# Patient Record
Sex: FEMALE | Race: WHITE | Employment: FULL TIME | URBAN - METROPOLITAN AREA
[De-identification: names, ages, dates, MRNs, and addresses within clinical notes are randomized per-mention and may not be internally consistent; named-entity substitution may affect disease eponyms.]

---

## 2017-02-07 ENCOUNTER — OFFICE VISIT (OUTPATIENT)
Dept: FAMILY MEDICINE CLINIC | Facility: CLINIC | Age: 42
End: 2017-02-07

## 2017-02-07 VITALS
HEIGHT: 63 IN | DIASTOLIC BLOOD PRESSURE: 80 MMHG | HEART RATE: 74 BPM | RESPIRATION RATE: 18 BRPM | BODY MASS INDEX: 27.46 KG/M2 | TEMPERATURE: 99 F | SYSTOLIC BLOOD PRESSURE: 120 MMHG | WEIGHT: 155 LBS

## 2017-02-07 DIAGNOSIS — Z01.419 WELL WOMAN EXAM WITH ROUTINE GYNECOLOGICAL EXAM: Primary | ICD-10-CM

## 2017-02-07 DIAGNOSIS — Z12.31 ENCOUNTER FOR SCREENING MAMMOGRAM FOR HIGH-RISK PATIENT: ICD-10-CM

## 2017-02-07 PROCEDURE — 99396 PREV VISIT EST AGE 40-64: CPT | Performed by: FAMILY MEDICINE

## 2017-02-07 PROCEDURE — 87624 HPV HI-RISK TYP POOLED RSLT: CPT | Performed by: FAMILY MEDICINE

## 2017-02-07 PROCEDURE — 88175 CYTOPATH C/V AUTO FLUID REDO: CPT | Performed by: FAMILY MEDICINE

## 2017-02-07 RX ORDER — MINOCYCLINE HYDROCHLORIDE 100 MG/1
100 CAPSULE ORAL DAILY
Refills: 0 | COMMUNITY
Start: 2017-01-25 | End: 2017-11-14

## 2017-02-07 NOTE — PROGRESS NOTES
HPI:   Donel Rubinstein is a 35year old female who presents for a complete physical exam.       Wt Readings from Last 6 Encounters:  02/07/17 : 155 lb  04/11/16 : 150 lb  11/17/15 : 153 lb  03/08/15 : 190 lb  12/20/14 : 191 lb  11/05/14 : 194 lb    Body m Alcohol Use: Yes                Comment: < 1 week    Occ:. : . Children: . 1  Exercise: none.   Diet: watches minimally     REVIEW OF SYSTEMS:   GENERAL: feels well otherwise  SKIN: denies any unusual skin lesions  EYES:denies blurred visio with routine gynecological exam    - T4 FREE [866]; Future  - TSH; Future  - Carey Trejo [id number]; Future  - Lipid Panel; Future  - CBC W Differential W Platelet; Future  - Vitamin B12; Future  - Vitamin D, 25-Hydroxy;  Future  - Comp Metabol

## 2017-02-10 LAB
HPV I/H RISK 1 DNA SPEC QL NAA+PROBE: NEGATIVE
LAST PAP RESULT: NORMAL
PAP HISTORY (OTHER THAN LAST PAP): NORMAL

## 2017-02-21 ENCOUNTER — HOSPITAL ENCOUNTER (OUTPATIENT)
Dept: MAMMOGRAPHY | Age: 42
Discharge: HOME | End: 2017-02-21
Attending: FAMILY MEDICINE
Payer: COMMERCIAL

## 2017-02-21 DIAGNOSIS — Z12.31 ENCOUNTER FOR SCREENING MAMMOGRAM FOR HIGH-RISK PATIENT: ICD-10-CM

## 2017-02-21 PROCEDURE — 77063 BREAST TOMOSYNTHESIS BI: CPT

## 2017-02-21 PROCEDURE — 77067 SCR MAMMO BI INCL CAD: CPT

## 2017-02-24 ENCOUNTER — HOSPITAL ENCOUNTER (OUTPATIENT)
Dept: ULTRASOUND IMAGING | Age: 42
Discharge: HOME | End: 2017-02-24
Attending: FAMILY MEDICINE
Payer: COMMERCIAL

## 2017-02-24 DIAGNOSIS — R92.8 ABNORMAL MAMMOGRAM OF LEFT BREAST: ICD-10-CM

## 2017-02-24 PROCEDURE — 76642 ULTRASOUND BREAST LIMITED: CPT

## 2017-02-25 ENCOUNTER — LAB ENCOUNTER (OUTPATIENT)
Dept: LAB | Age: 42
End: 2017-02-25
Attending: FAMILY MEDICINE
Payer: COMMERCIAL

## 2017-02-25 DIAGNOSIS — Z01.419 WELL WOMAN EXAM WITH ROUTINE GYNECOLOGICAL EXAM: ICD-10-CM

## 2017-02-25 LAB
25-HYDROXYVITAMIN D (TOTAL): 91.6 NG/ML (ref 30–100)
ALBUMIN SERPL-MCNC: 3.8 G/DL (ref 3.5–4.8)
ALP LIVER SERPL-CCNC: 42 U/L (ref 37–98)
ALT SERPL-CCNC: 21 U/L (ref 14–54)
AST SERPL-CCNC: 18 U/L (ref 15–41)
BASOPHILS # BLD AUTO: 0.06 X10(3) UL (ref 0–0.1)
BASOPHILS NFR BLD AUTO: 1 %
BILIRUB SERPL-MCNC: 0.7 MG/DL (ref 0.1–2)
BUN BLD-MCNC: 9 MG/DL (ref 8–20)
CALCIUM BLD-MCNC: 8.9 MG/DL (ref 8.3–10.3)
CHLORIDE: 108 MMOL/L (ref 101–111)
CHOLEST SMN-MCNC: 175 MG/DL (ref ?–200)
CO2: 28 MMOL/L (ref 22–32)
CREAT BLD-MCNC: 0.84 MG/DL (ref 0.55–1.02)
EOSINOPHIL # BLD AUTO: 0.06 X10(3) UL (ref 0–0.3)
EOSINOPHIL NFR BLD AUTO: 1 %
ERYTHROCYTE [DISTWIDTH] IN BLOOD BY AUTOMATED COUNT: 12.2 % (ref 11.5–16)
FREE T4: 1.1 NG/DL (ref 0.9–1.8)
GLUCOSE BLD-MCNC: 101 MG/DL (ref 70–99)
HAV AB SERPL IA-ACNC: 1620 PG/ML (ref 193–986)
HCT VFR BLD AUTO: 44.2 % (ref 34–50)
HDLC SERPL-MCNC: 61 MG/DL (ref 45–?)
HDLC SERPL: 2.87 {RATIO} (ref ?–4.44)
HGB BLD-MCNC: 14.5 G/DL (ref 12–16)
IMMATURE GRANULOCYTE COUNT: 0.02 X10(3) UL (ref 0–1)
IMMATURE GRANULOCYTE RATIO %: 0.3 %
LDLC SERPL CALC-MCNC: 103 MG/DL (ref ?–130)
LYMPHOCYTES # BLD AUTO: 2.06 X10(3) UL (ref 0.9–4)
LYMPHOCYTES NFR BLD AUTO: 33 %
M PROTEIN MFR SERPL ELPH: 7.1 G/DL (ref 6.1–8.3)
MCH RBC QN AUTO: 31.7 PG (ref 27–33.2)
MCHC RBC AUTO-ENTMCNC: 32.8 G/DL (ref 31–37)
MCV RBC AUTO: 96.7 FL (ref 81–100)
MONOCYTES # BLD AUTO: 0.71 X10(3) UL (ref 0.1–0.6)
MONOCYTES NFR BLD AUTO: 11.4 %
NEUTROPHIL ABS PRELIM: 3.34 X10 (3) UL (ref 1.3–6.7)
NEUTROPHILS # BLD AUTO: 3.34 X10(3) UL (ref 1.3–6.7)
NEUTROPHILS NFR BLD AUTO: 53.3 %
NONHDLC SERPL-MCNC: 114 MG/DL (ref ?–130)
PLATELET # BLD AUTO: 317 10(3)UL (ref 150–450)
POTASSIUM SERPL-SCNC: 4.7 MMOL/L (ref 3.6–5.1)
RBC # BLD AUTO: 4.57 X10(6)UL (ref 3.8–5.1)
RED CELL DISTRIBUTION WIDTH-SD: 43.4 FL (ref 35.1–46.3)
SODIUM SERPL-SCNC: 141 MMOL/L (ref 136–144)
T3FREE SERPL-MCNC: 2.53 PG/ML (ref 2.3–4.2)
TRIGLYCERIDES: 56 MG/DL (ref ?–150)
TSI SER-ACNC: 0.93 MIU/ML (ref 0.35–5.5)
VLDL: 11 MG/DL (ref 5–40)
WBC # BLD AUTO: 6.3 X10(3) UL (ref 4–13)

## 2017-02-25 PROCEDURE — 84439 ASSAY OF FREE THYROXINE: CPT

## 2017-02-25 PROCEDURE — 84481 FREE ASSAY (FT-3): CPT

## 2017-02-25 PROCEDURE — 84443 ASSAY THYROID STIM HORMONE: CPT

## 2017-02-25 PROCEDURE — 36415 COLL VENOUS BLD VENIPUNCTURE: CPT

## 2017-02-25 PROCEDURE — 80053 COMPREHEN METABOLIC PANEL: CPT

## 2017-02-25 PROCEDURE — 85025 COMPLETE CBC W/AUTO DIFF WBC: CPT

## 2017-02-25 PROCEDURE — 82306 VITAMIN D 25 HYDROXY: CPT

## 2017-02-25 PROCEDURE — 80061 LIPID PANEL: CPT

## 2017-02-25 PROCEDURE — 82607 VITAMIN B-12: CPT

## 2017-11-14 ENCOUNTER — OFFICE VISIT (OUTPATIENT)
Dept: FAMILY MEDICINE CLINIC | Facility: CLINIC | Age: 42
End: 2017-11-14

## 2017-11-14 VITALS
SYSTOLIC BLOOD PRESSURE: 134 MMHG | DIASTOLIC BLOOD PRESSURE: 76 MMHG | WEIGHT: 153 LBS | HEIGHT: 63 IN | TEMPERATURE: 98 F | BODY MASS INDEX: 27.11 KG/M2 | HEART RATE: 72 BPM | RESPIRATION RATE: 16 BRPM

## 2017-11-14 DIAGNOSIS — B35.6 TINEA CRURIS: Primary | ICD-10-CM

## 2017-11-14 PROCEDURE — 99213 OFFICE O/P EST LOW 20 MIN: CPT | Performed by: INTERNAL MEDICINE

## 2017-11-14 RX ORDER — DOXYCYCLINE HYCLATE 100 MG/1
CAPSULE ORAL
COMMUNITY
Start: 2017-10-01 | End: 2019-06-17

## 2017-11-14 RX ORDER — CLINDAMYCIN PHOSPHATE 10 MG/G
GEL TOPICAL
COMMUNITY
Start: 2016-08-31 | End: 2018-06-04

## 2017-11-14 RX ORDER — NYSTATIN 100000 U/G
CREAM TOPICAL
Qty: 30 G | Refills: 0 | Status: SHIPPED | OUTPATIENT
Start: 2017-11-14 | End: 2018-04-30

## 2017-11-14 RX ORDER — TRETINOIN 0.01 %
GEL (GRAM) TOPICAL AS NEEDED
COMMUNITY
Start: 2016-11-01 | End: 2021-03-22 | Stop reason: ALTCHOICE

## 2017-11-14 NOTE — PROGRESS NOTES
Roland Packer is a 38year old female. HPI:   Here for rash under right breast for 2 months. Tried lotriman for a week but no improvement. Slightly itches, but overall does not bother her. Exercises a lot.     Current Outpatient Prescriptions:  Doxycy

## 2018-05-01 RX ORDER — NYSTATIN 100000 U/G
CREAM TOPICAL
Qty: 30 G | Refills: 0 | Status: SHIPPED
Start: 2018-05-01 | End: 2019-06-17

## 2018-05-01 NOTE — TELEPHONE ENCOUNTER
Let pt know her nystatin was refilled.  She is overdue for annual physical and mammogram. Schedule that soon

## 2018-05-01 NOTE — TELEPHONE ENCOUNTER
From: Maxx Santiago  Sent: 4/30/2018 10:09 AM CDT  Subject: Medication Renewal Request    Roxanna Roberts would like a refill of the following medications:     nystatin 092150 UNIT/GM External Cream [Jillian Croft NP]   Patient Comment: Yeast

## 2018-06-04 ENCOUNTER — OFFICE VISIT (OUTPATIENT)
Dept: FAMILY MEDICINE CLINIC | Facility: CLINIC | Age: 43
End: 2018-06-04

## 2018-06-04 ENCOUNTER — LAB ENCOUNTER (OUTPATIENT)
Dept: LAB | Age: 43
End: 2018-06-04
Attending: INTERNAL MEDICINE
Payer: COMMERCIAL

## 2018-06-04 VITALS
TEMPERATURE: 99 F | HEIGHT: 63 IN | WEIGHT: 147 LBS | DIASTOLIC BLOOD PRESSURE: 78 MMHG | OXYGEN SATURATION: 99 % | HEART RATE: 70 BPM | RESPIRATION RATE: 18 BRPM | BODY MASS INDEX: 26.05 KG/M2 | SYSTOLIC BLOOD PRESSURE: 120 MMHG

## 2018-06-04 DIAGNOSIS — Z12.31 ENCOUNTER FOR SCREENING MAMMOGRAM FOR MALIGNANT NEOPLASM OF BREAST: ICD-10-CM

## 2018-06-04 DIAGNOSIS — Z01.419 WELL WOMAN EXAM: ICD-10-CM

## 2018-06-04 DIAGNOSIS — Z01.419 WELL WOMAN EXAM: Primary | ICD-10-CM

## 2018-06-04 PROCEDURE — 80050 GENERAL HEALTH PANEL: CPT | Performed by: INTERNAL MEDICINE

## 2018-06-04 PROCEDURE — 36415 COLL VENOUS BLD VENIPUNCTURE: CPT | Performed by: INTERNAL MEDICINE

## 2018-06-04 PROCEDURE — 80061 LIPID PANEL: CPT | Performed by: INTERNAL MEDICINE

## 2018-06-04 PROCEDURE — 82306 VITAMIN D 25 HYDROXY: CPT | Performed by: INTERNAL MEDICINE

## 2018-06-04 PROCEDURE — 99396 PREV VISIT EST AGE 40-64: CPT | Performed by: INTERNAL MEDICINE

## 2018-06-04 NOTE — PROGRESS NOTES
HPI:   Raynald Aschoff is a 38year old female who presents for a well woman exam. Symptoms: denies discharge, itching, burning or dysuria, periods are regular, flow is 4-5 days. Patient's last menstrual period was 06/01/2018.   Previous pap: 2017 elo anxiety  HEME: denies hx of anemia  ENDOCRINE: denies thyroid history, denies excessive thirst, denies significant weight change  ALL/ASTHMA: denies hx of allergy or asthma    EXAM:   /78   Pulse 70   Temp 98.7 °F (37.1 °C) (Oral)   Resp 18   Ht 63\" ask questions and verbalized understanding of care. Follow up . Abdirizak Valdovinos

## 2018-06-19 ENCOUNTER — OFFICE VISIT (OUTPATIENT)
Dept: FAMILY MEDICINE CLINIC | Facility: CLINIC | Age: 43
End: 2018-06-19

## 2018-06-19 VITALS
TEMPERATURE: 99 F | WEIGHT: 141 LBS | RESPIRATION RATE: 16 BRPM | DIASTOLIC BLOOD PRESSURE: 72 MMHG | BODY MASS INDEX: 24.98 KG/M2 | HEIGHT: 63 IN | HEART RATE: 86 BPM | SYSTOLIC BLOOD PRESSURE: 120 MMHG

## 2018-06-19 DIAGNOSIS — B07.8 COMMON WART: Primary | ICD-10-CM

## 2018-06-19 PROCEDURE — 17110 DESTRUCTION B9 LES UP TO 14: CPT | Performed by: INTERNAL MEDICINE

## 2018-06-19 RX ORDER — NYSTATIN 100000 [USP'U]/G
POWDER TOPICAL
Qty: 45 G | Refills: 1 | Status: SHIPPED | OUTPATIENT
Start: 2018-06-19 | End: 2019-06-17

## 2018-06-19 NOTE — PROGRESS NOTES
Shirin Richey presents with wart(s) on right toe. Alvaro Bruce is not experiencing pain. There are approximately one. She would like them treated. She has tried a pen freezing otc medication once with no results.     _____________________________________

## 2018-06-23 ENCOUNTER — HOSPITAL ENCOUNTER (OUTPATIENT)
Dept: MAMMOGRAPHY | Facility: HOSPITAL | Age: 43
Discharge: HOME | End: 2018-06-23
Attending: INTERNAL MEDICINE
Payer: COMMERCIAL

## 2018-06-23 DIAGNOSIS — Z12.31 ENCOUNTER FOR SCREENING MAMMOGRAM FOR MALIGNANT NEOPLASM OF BREAST: ICD-10-CM

## 2018-06-23 PROCEDURE — 77063 BREAST TOMOSYNTHESIS BI: CPT | Performed by: INTERNAL MEDICINE

## 2018-06-23 PROCEDURE — 77067 SCR MAMMO BI INCL CAD: CPT | Performed by: INTERNAL MEDICINE

## 2019-04-04 RX ORDER — NYSTATIN 100000 U/G
CREAM TOPICAL
Qty: 30 G | Refills: 0 | OUTPATIENT
Start: 2019-04-04

## 2019-06-17 ENCOUNTER — OFFICE VISIT (OUTPATIENT)
Dept: FAMILY MEDICINE CLINIC | Facility: CLINIC | Age: 44
End: 2019-06-17
Payer: COMMERCIAL

## 2019-06-17 VITALS
TEMPERATURE: 97 F | WEIGHT: 128 LBS | BODY MASS INDEX: 22.68 KG/M2 | DIASTOLIC BLOOD PRESSURE: 78 MMHG | RESPIRATION RATE: 20 BRPM | OXYGEN SATURATION: 98 % | HEART RATE: 61 BPM | HEIGHT: 63 IN | SYSTOLIC BLOOD PRESSURE: 122 MMHG

## 2019-06-17 DIAGNOSIS — Z00.00 ROUTINE GENERAL MEDICAL EXAMINATION AT A HEALTH CARE FACILITY: Primary | ICD-10-CM

## 2019-06-17 DIAGNOSIS — Z12.31 ENCOUNTER FOR SCREENING MAMMOGRAM FOR MALIGNANT NEOPLASM OF BREAST: ICD-10-CM

## 2019-06-17 DIAGNOSIS — M17.11 PRIMARY OSTEOARTHRITIS OF RIGHT KNEE: ICD-10-CM

## 2019-06-17 DIAGNOSIS — M67.40 GANGLION CYST: ICD-10-CM

## 2019-06-17 PROCEDURE — 99396 PREV VISIT EST AGE 40-64: CPT | Performed by: INTERNAL MEDICINE

## 2019-06-17 RX ORDER — NYSTATIN 100000 U/G
CREAM TOPICAL
Qty: 30 G | Refills: 0 | Status: SHIPPED | OUTPATIENT
Start: 2019-06-17 | End: 2020-07-16 | Stop reason: ALTCHOICE

## 2019-06-17 NOTE — PROGRESS NOTES
HPI:   Donel Rubinstein is a 37year old female who presents for a well woman exam. Symptoms: denies discharge, itching, burning or dysuria, periods are regular, light flow, duration 4-5. Patient's last menstrual period was 05/31/2019.   Previous pap: 20 pain  MS: denies back pain  NEURO: denies headaches or dizziness  PSYCH: denies depression or anxiety  HEME: denies hx of anemia  ENDOCRINE: denies thyroid history, denies excessive thirst, denies significant weight change  ALL/ASTHMA: denies hx of allergy breast  - VENUS RUBINA 2D+3D SCREENING BILAT (CPT=77067/06764); Future    4.  Primary osteoarthritis of right knee  Pt reassurance  Discussed exercises to avoid to lessen strain to the knees        Cruz Player was given an opportunity to ask questions and verbalized

## 2019-06-29 ENCOUNTER — LAB ENCOUNTER (OUTPATIENT)
Dept: LAB | Age: 44
End: 2019-06-29
Attending: INTERNAL MEDICINE
Payer: COMMERCIAL

## 2019-06-29 ENCOUNTER — HOSPITAL ENCOUNTER (OUTPATIENT)
Dept: MAMMOGRAPHY | Age: 44
Discharge: HOME | End: 2019-06-29
Attending: INTERNAL MEDICINE
Payer: COMMERCIAL

## 2019-06-29 DIAGNOSIS — Z12.31 ENCOUNTER FOR SCREENING MAMMOGRAM FOR MALIGNANT NEOPLASM OF BREAST: ICD-10-CM

## 2019-06-29 DIAGNOSIS — Z00.00 ROUTINE GENERAL MEDICAL EXAMINATION AT A HEALTH CARE FACILITY: ICD-10-CM

## 2019-06-29 LAB
ALBUMIN SERPL-MCNC: 3.2 G/DL (ref 3.4–5)
ALBUMIN/GLOB SERPL: 1 {RATIO} (ref 1–2)
ALP LIVER SERPL-CCNC: 33 U/L (ref 37–98)
ALT SERPL-CCNC: 19 U/L (ref 13–56)
ANION GAP SERPL CALC-SCNC: 2 MMOL/L (ref 0–18)
AST SERPL-CCNC: 19 U/L (ref 15–37)
BASOPHILS # BLD AUTO: 0.04 X10(3) UL (ref 0–0.2)
BASOPHILS NFR BLD AUTO: 0.7 %
BILIRUB SERPL-MCNC: 0.4 MG/DL (ref 0.1–2)
BUN BLD-MCNC: 13 MG/DL (ref 7–18)
BUN/CREAT SERPL: 15.3 (ref 10–20)
CALCIUM BLD-MCNC: 8.7 MG/DL (ref 8.5–10.1)
CHLORIDE SERPL-SCNC: 110 MMOL/L (ref 98–112)
CHOLEST SMN-MCNC: 140 MG/DL (ref ?–200)
CO2 SERPL-SCNC: 28 MMOL/L (ref 21–32)
CREAT BLD-MCNC: 0.85 MG/DL (ref 0.55–1.02)
DEPRECATED RDW RBC AUTO: 45.2 FL (ref 35.1–46.3)
EOSINOPHIL # BLD AUTO: 0.1 X10(3) UL (ref 0–0.7)
EOSINOPHIL NFR BLD AUTO: 1.8 %
ERYTHROCYTE [DISTWIDTH] IN BLOOD BY AUTOMATED COUNT: 12.6 % (ref 11–15)
GLOBULIN PLAS-MCNC: 3.2 G/DL (ref 2.8–4.4)
GLUCOSE BLD-MCNC: 93 MG/DL (ref 70–99)
HCT VFR BLD AUTO: 41.2 % (ref 35–48)
HDLC SERPL-MCNC: 50 MG/DL (ref 40–59)
HGB BLD-MCNC: 13.3 G/DL (ref 12–16)
IMM GRANULOCYTES # BLD AUTO: 0.01 X10(3) UL (ref 0–1)
IMM GRANULOCYTES NFR BLD: 0.2 %
LDLC SERPL CALC-MCNC: 82 MG/DL (ref ?–100)
LYMPHOCYTES # BLD AUTO: 1.73 X10(3) UL (ref 1–4)
LYMPHOCYTES NFR BLD AUTO: 31.6 %
M PROTEIN MFR SERPL ELPH: 6.4 G/DL (ref 6.4–8.2)
MCH RBC QN AUTO: 31.4 PG (ref 26–34)
MCHC RBC AUTO-ENTMCNC: 32.3 G/DL (ref 31–37)
MCV RBC AUTO: 97.4 FL (ref 80–100)
MONOCYTES # BLD AUTO: 0.63 X10(3) UL (ref 0.1–1)
MONOCYTES NFR BLD AUTO: 11.5 %
NEUTROPHILS # BLD AUTO: 2.96 X10 (3) UL (ref 1.5–7.7)
NEUTROPHILS # BLD AUTO: 2.96 X10(3) UL (ref 1.5–7.7)
NEUTROPHILS NFR BLD AUTO: 54.2 %
NONHDLC SERPL-MCNC: 90 MG/DL (ref ?–130)
OSMOLALITY SERPL CALC.SUM OF ELEC: 290 MOSM/KG (ref 275–295)
PLATELET # BLD AUTO: 252 10(3)UL (ref 150–450)
POTASSIUM SERPL-SCNC: 4.5 MMOL/L (ref 3.5–5.1)
RBC # BLD AUTO: 4.23 X10(6)UL (ref 3.8–5.3)
SODIUM SERPL-SCNC: 140 MMOL/L (ref 136–145)
TRIGL SERPL-MCNC: 41 MG/DL (ref 30–149)
TSI SER-ACNC: 1.26 MIU/ML (ref 0.36–3.74)
VIT D+METAB SERPL-MCNC: 100.2 NG/ML (ref 30–100)
VLDLC SERPL CALC-MCNC: 8 MG/DL (ref 0–30)
WBC # BLD AUTO: 5.5 X10(3) UL (ref 4–11)

## 2019-06-29 PROCEDURE — 77063 BREAST TOMOSYNTHESIS BI: CPT | Performed by: INTERNAL MEDICINE

## 2019-06-29 PROCEDURE — 80061 LIPID PANEL: CPT | Performed by: INTERNAL MEDICINE

## 2019-06-29 PROCEDURE — 82306 VITAMIN D 25 HYDROXY: CPT | Performed by: INTERNAL MEDICINE

## 2019-06-29 PROCEDURE — 80050 GENERAL HEALTH PANEL: CPT | Performed by: INTERNAL MEDICINE

## 2019-06-29 PROCEDURE — 77067 SCR MAMMO BI INCL CAD: CPT | Performed by: INTERNAL MEDICINE

## 2019-06-29 PROCEDURE — 36415 COLL VENOUS BLD VENIPUNCTURE: CPT | Performed by: INTERNAL MEDICINE

## 2020-07-16 ENCOUNTER — OFFICE VISIT (OUTPATIENT)
Dept: FAMILY MEDICINE CLINIC | Facility: CLINIC | Age: 45
End: 2020-07-16
Payer: COMMERCIAL

## 2020-07-16 VITALS
SYSTOLIC BLOOD PRESSURE: 122 MMHG | RESPIRATION RATE: 16 BRPM | HEART RATE: 80 BPM | DIASTOLIC BLOOD PRESSURE: 72 MMHG | OXYGEN SATURATION: 98 % | BODY MASS INDEX: 21.79 KG/M2 | TEMPERATURE: 98 F | HEIGHT: 63 IN | WEIGHT: 123 LBS

## 2020-07-16 DIAGNOSIS — Z01.419 WELL WOMAN EXAM WITH ROUTINE GYNECOLOGICAL EXAM: Primary | ICD-10-CM

## 2020-07-16 DIAGNOSIS — Z00.00 ANNUAL PHYSICAL EXAM: ICD-10-CM

## 2020-07-16 DIAGNOSIS — Z12.31 ENCOUNTER FOR SCREENING MAMMOGRAM FOR HIGH-RISK PATIENT: ICD-10-CM

## 2020-07-16 PROCEDURE — 90715 TDAP VACCINE 7 YRS/> IM: CPT | Performed by: FAMILY MEDICINE

## 2020-07-16 PROCEDURE — 87625 HPV TYPES 16 & 18 ONLY: CPT | Performed by: FAMILY MEDICINE

## 2020-07-16 PROCEDURE — 3078F DIAST BP <80 MM HG: CPT | Performed by: FAMILY MEDICINE

## 2020-07-16 PROCEDURE — 88175 CYTOPATH C/V AUTO FLUID REDO: CPT | Performed by: FAMILY MEDICINE

## 2020-07-16 PROCEDURE — 90471 IMMUNIZATION ADMIN: CPT | Performed by: FAMILY MEDICINE

## 2020-07-16 PROCEDURE — 3008F BODY MASS INDEX DOCD: CPT | Performed by: FAMILY MEDICINE

## 2020-07-16 PROCEDURE — 87624 HPV HI-RISK TYP POOLED RSLT: CPT | Performed by: FAMILY MEDICINE

## 2020-07-16 PROCEDURE — 3074F SYST BP LT 130 MM HG: CPT | Performed by: FAMILY MEDICINE

## 2020-07-16 PROCEDURE — 99396 PREV VISIT EST AGE 40-64: CPT | Performed by: FAMILY MEDICINE

## 2020-07-16 NOTE — PROGRESS NOTES
HPI:   Jacy Peña is a 53year old female who presents for a complete physical exam.       Wt Readings from Last 6 Encounters:  07/16/20 : 123 lb (55.8 kg)  07/18/19 : 130 lb (59 kg)  06/17/19 : 128 lb (58.1 kg)  06/19/18 : 141 lb (64 kg)  06/04/18 : • Diabetes Father    • Hypertension Mother    • Cancer Maternal Grandmother         colon   • Breast Cancer Maternal Cousin Female 35   • Heart Disease Neg    • Stroke Neg       Social History:   Social History    Tobacco Use      Smoking status: Never internal and external hemorrhoids   MUSCULOSKELETAL: back is not tender,FROM of the back  EXTREMITIES: no cyanosis, clubbing or edema  NEURO: cranial nerves are intact,motor and sensory are grossly intact    ASSESSMENT AND PLAN:   Ayan gomez a Kimberlyn

## 2020-07-17 LAB — HPV I/H RISK 1 DNA SPEC QL NAA+PROBE: POSITIVE

## 2020-07-21 DIAGNOSIS — R87.619 ABNORMAL CERVICAL PAPANICOLAOU SMEAR, UNSPECIFIED ABNORMAL PAP FINDING: Primary | ICD-10-CM

## 2020-07-21 LAB
HPV16 DNA CVX QL PROBE+SIG AMP: NEGATIVE
HPV18 DNA CVX QL PROBE+SIG AMP: POSITIVE

## 2020-07-23 ENCOUNTER — OFFICE VISIT (OUTPATIENT)
Dept: OBGYN CLINIC | Facility: CLINIC | Age: 45
End: 2020-07-23
Payer: COMMERCIAL

## 2020-07-23 VITALS
SYSTOLIC BLOOD PRESSURE: 106 MMHG | BODY MASS INDEX: 22.15 KG/M2 | HEIGHT: 63 IN | HEART RATE: 54 BPM | DIASTOLIC BLOOD PRESSURE: 62 MMHG | WEIGHT: 125 LBS

## 2020-07-23 DIAGNOSIS — Z01.818 ENCOUNTER FOR PREOPERATIVE EXAMINATION FOR GENERAL SURGICAL PROCEDURE: ICD-10-CM

## 2020-07-23 DIAGNOSIS — R87.810 CERVICAL HIGH RISK HUMAN PAPILLOMAVIRUS (HPV) DNA TEST POSITIVE: ICD-10-CM

## 2020-07-23 DIAGNOSIS — R87.612 PAPANICOLAOU SMEAR OF CERVIX WITH LOW GRADE SQUAMOUS INTRAEPITHELIAL LESION (LGSIL): Primary | ICD-10-CM

## 2020-07-23 LAB
CONTROL LINE PRESENT WITH A CLEAR BACKGROUND (YES/NO): YES YES/NO
PREGNANCY TEST, URINE: NEGATIVE

## 2020-07-23 PROCEDURE — 88305 TISSUE EXAM BY PATHOLOGIST: CPT | Performed by: OBSTETRICS & GYNECOLOGY

## 2020-07-23 PROCEDURE — 3008F BODY MASS INDEX DOCD: CPT | Performed by: OBSTETRICS & GYNECOLOGY

## 2020-07-23 PROCEDURE — 57454 BX/CURETT OF CERVIX W/SCOPE: CPT | Performed by: OBSTETRICS & GYNECOLOGY

## 2020-07-23 PROCEDURE — 3074F SYST BP LT 130 MM HG: CPT | Performed by: OBSTETRICS & GYNECOLOGY

## 2020-07-23 PROCEDURE — 81025 URINE PREGNANCY TEST: CPT | Performed by: OBSTETRICS & GYNECOLOGY

## 2020-07-23 PROCEDURE — 3078F DIAST BP <80 MM HG: CPT | Performed by: OBSTETRICS & GYNECOLOGY

## 2020-07-23 NOTE — PROGRESS NOTES
Colpo w/Cx Biopsy and ECC       LGSL HPV 18/45 pos    Pregnancy Results: negative from urine test     Discussed high/low risk HPV, dormant vs active state of the virus, unknown exposure time.  Discussed pap smear being a screening test and if abnormal follo

## 2020-07-24 ENCOUNTER — HOSPITAL ENCOUNTER (OUTPATIENT)
Dept: MAMMOGRAPHY | Age: 45
Discharge: HOME | End: 2020-07-24
Attending: FAMILY MEDICINE
Payer: COMMERCIAL

## 2020-07-24 DIAGNOSIS — Z12.31 ENCOUNTER FOR SCREENING MAMMOGRAM FOR HIGH-RISK PATIENT: ICD-10-CM

## 2020-07-24 PROCEDURE — 77063 BREAST TOMOSYNTHESIS BI: CPT | Performed by: FAMILY MEDICINE

## 2020-07-24 PROCEDURE — 77067 SCR MAMMO BI INCL CAD: CPT | Performed by: FAMILY MEDICINE

## 2020-08-03 ENCOUNTER — LAB ENCOUNTER (OUTPATIENT)
Dept: LAB | Age: 45
End: 2020-08-03
Attending: FAMILY MEDICINE
Payer: COMMERCIAL

## 2020-08-03 DIAGNOSIS — Z00.00 ANNUAL PHYSICAL EXAM: ICD-10-CM

## 2020-08-03 LAB
ALBUMIN SERPL-MCNC: 3.5 G/DL (ref 3.4–5)
ALBUMIN/GLOB SERPL: 1 {RATIO} (ref 1–2)
ALP LIVER SERPL-CCNC: 40 U/L (ref 39–100)
ALT SERPL-CCNC: 44 U/L (ref 13–56)
ANION GAP SERPL CALC-SCNC: <0 MMOL/L (ref 0–18)
AST SERPL-CCNC: 40 U/L (ref 15–37)
BASOPHILS # BLD AUTO: 0.07 X10(3) UL (ref 0–0.2)
BASOPHILS NFR BLD AUTO: 1.3 %
BILIRUB SERPL-MCNC: 0.3 MG/DL (ref 0.1–2)
BUN BLD-MCNC: 20 MG/DL (ref 7–18)
BUN/CREAT SERPL: 21.3 (ref 10–20)
CALCIUM BLD-MCNC: 8.3 MG/DL (ref 8.5–10.1)
CHLORIDE SERPL-SCNC: 109 MMOL/L (ref 98–112)
CHOLEST SMN-MCNC: 184 MG/DL (ref ?–200)
CO2 SERPL-SCNC: 30 MMOL/L (ref 21–32)
CREAT BLD-MCNC: 0.94 MG/DL (ref 0.55–1.02)
DEPRECATED RDW RBC AUTO: 45.8 FL (ref 35.1–46.3)
EOSINOPHIL # BLD AUTO: 0.1 X10(3) UL (ref 0–0.7)
EOSINOPHIL NFR BLD AUTO: 1.9 %
ERYTHROCYTE [DISTWIDTH] IN BLOOD BY AUTOMATED COUNT: 12.5 % (ref 11–15)
GLOBULIN PLAS-MCNC: 3.4 G/DL (ref 2.8–4.4)
GLUCOSE BLD-MCNC: 90 MG/DL (ref 70–99)
HCT VFR BLD AUTO: 41.1 % (ref 35–48)
HDLC SERPL-MCNC: 56 MG/DL (ref 40–59)
HGB BLD-MCNC: 13.1 G/DL (ref 12–16)
IMM GRANULOCYTES # BLD AUTO: 0.01 X10(3) UL (ref 0–1)
IMM GRANULOCYTES NFR BLD: 0.2 %
LDLC SERPL CALC-MCNC: 118 MG/DL (ref ?–100)
LYMPHOCYTES # BLD AUTO: 1.94 X10(3) UL (ref 1–4)
LYMPHOCYTES NFR BLD AUTO: 36.3 %
M PROTEIN MFR SERPL ELPH: 6.9 G/DL (ref 6.4–8.2)
MCH RBC QN AUTO: 31.6 PG (ref 26–34)
MCHC RBC AUTO-ENTMCNC: 31.9 G/DL (ref 31–37)
MCV RBC AUTO: 99 FL (ref 80–100)
MONOCYTES # BLD AUTO: 0.63 X10(3) UL (ref 0.1–1)
MONOCYTES NFR BLD AUTO: 11.8 %
NEUTROPHILS # BLD AUTO: 2.59 X10 (3) UL (ref 1.5–7.7)
NEUTROPHILS # BLD AUTO: 2.59 X10(3) UL (ref 1.5–7.7)
NEUTROPHILS NFR BLD AUTO: 48.5 %
NONHDLC SERPL-MCNC: 128 MG/DL (ref ?–130)
OSMOLALITY SERPL CALC.SUM OF ELEC: 288 MOSM/KG (ref 275–295)
PATIENT FASTING Y/N/NP: YES
PATIENT FASTING Y/N/NP: YES
PLATELET # BLD AUTO: 286 10(3)UL (ref 150–450)
POTASSIUM SERPL-SCNC: 4.3 MMOL/L (ref 3.5–5.1)
RBC # BLD AUTO: 4.15 X10(6)UL (ref 3.8–5.3)
SODIUM SERPL-SCNC: 138 MMOL/L (ref 136–145)
T4 FREE SERPL-MCNC: 1 NG/DL (ref 0.8–1.7)
TRIGL SERPL-MCNC: 50 MG/DL (ref 30–149)
TSI SER-ACNC: 1.97 MIU/ML (ref 0.36–3.74)
VIT B12 SERPL-MCNC: 1133 PG/ML (ref 193–986)
VIT D+METAB SERPL-MCNC: 107.5 NG/ML (ref 30–100)
VLDLC SERPL CALC-MCNC: 10 MG/DL (ref 0–30)
WBC # BLD AUTO: 5.3 X10(3) UL (ref 4–11)

## 2020-08-03 PROCEDURE — 80050 GENERAL HEALTH PANEL: CPT | Performed by: FAMILY MEDICINE

## 2020-08-03 PROCEDURE — 80061 LIPID PANEL: CPT | Performed by: FAMILY MEDICINE

## 2020-08-03 PROCEDURE — 36415 COLL VENOUS BLD VENIPUNCTURE: CPT | Performed by: FAMILY MEDICINE

## 2020-08-03 PROCEDURE — 84439 ASSAY OF FREE THYROXINE: CPT | Performed by: FAMILY MEDICINE

## 2020-08-03 PROCEDURE — 82306 VITAMIN D 25 HYDROXY: CPT | Performed by: FAMILY MEDICINE

## 2020-08-03 PROCEDURE — 82607 VITAMIN B-12: CPT | Performed by: FAMILY MEDICINE

## 2020-08-04 DIAGNOSIS — E55.9 VITAMIN D DEFICIENCY: Primary | ICD-10-CM

## 2020-08-04 DIAGNOSIS — R74.8 ELEVATED LIVER ENZYMES: ICD-10-CM

## 2020-08-05 NOTE — PROGRESS NOTES
Patient aware of colposcopy results and recommendations for cryo. Appointment schedule.  Patient verbalizes understanding

## 2020-08-24 ENCOUNTER — VIRTUAL PHONE E/M (OUTPATIENT)
Dept: FAMILY MEDICINE CLINIC | Facility: CLINIC | Age: 45
End: 2020-08-24
Payer: COMMERCIAL

## 2020-08-24 ENCOUNTER — OFFICE VISIT (OUTPATIENT)
Dept: OBGYN CLINIC | Facility: CLINIC | Age: 45
End: 2020-08-24
Payer: COMMERCIAL

## 2020-08-24 VITALS
HEART RATE: 86 BPM | SYSTOLIC BLOOD PRESSURE: 114 MMHG | WEIGHT: 126 LBS | HEIGHT: 63 IN | BODY MASS INDEX: 22.32 KG/M2 | DIASTOLIC BLOOD PRESSURE: 70 MMHG

## 2020-08-24 DIAGNOSIS — R21 RASH: Primary | ICD-10-CM

## 2020-08-24 DIAGNOSIS — R87.619 ABNORMAL CERVICAL PAPANICOLAOU SMEAR, UNSPECIFIED ABNORMAL PAP FINDING: ICD-10-CM

## 2020-08-24 DIAGNOSIS — N87.0 CIN I (CERVICAL INTRAEPITHELIAL NEOPLASIA I): Primary | ICD-10-CM

## 2020-08-24 LAB
CONTROL LINE PRESENT WITH A CLEAR BACKGROUND (YES/NO): YES YES/NO
PREGNANCY TEST, URINE: NEGATIVE

## 2020-08-24 PROCEDURE — 3074F SYST BP LT 130 MM HG: CPT | Performed by: OBSTETRICS & GYNECOLOGY

## 2020-08-24 PROCEDURE — 57511 CRYOCAUTERY OF CERVIX: CPT | Performed by: OBSTETRICS & GYNECOLOGY

## 2020-08-24 PROCEDURE — 3008F BODY MASS INDEX DOCD: CPT | Performed by: OBSTETRICS & GYNECOLOGY

## 2020-08-24 PROCEDURE — 99213 OFFICE O/P EST LOW 20 MIN: CPT | Performed by: INTERNAL MEDICINE

## 2020-08-24 PROCEDURE — 81025 URINE PREGNANCY TEST: CPT | Performed by: OBSTETRICS & GYNECOLOGY

## 2020-08-24 PROCEDURE — 3078F DIAST BP <80 MM HG: CPT | Performed by: OBSTETRICS & GYNECOLOGY

## 2020-08-24 NOTE — PROGRESS NOTES
Virtual Telephone Check-In    Dainasujatha Lanejanes verbally consents to a Virtual/Telephone Check-In visit on 08/24/20. Patient has been referred to the [facility] website at [email or web address] to review the yearly Consent to Treat document.     Patient jessa AND PLAN:   Rash  (primary encounter diagnosis)  - history correlates to tinea but picture appears as nummular eczema  - trial of triamcinolone for 1 week, if not improving then we'll switch to an anti fungal. Pt agrees     No orders of the defined types w

## 2020-08-24 NOTE — PROGRESS NOTES
CRYO OF THE CERVIX    IAIN 1    Patient was placed in lithotomy position  Speculum placed vaginally, cervix appears normal, no lesions  Cryo probe placed against cervix and freezing done for 60 sec, repeated x2    Patient tolerated well  No complications

## 2020-11-05 ENCOUNTER — OFFICE VISIT (OUTPATIENT)
Dept: PODIATRY CLINIC | Facility: CLINIC | Age: 45
End: 2020-11-05
Payer: COMMERCIAL

## 2020-11-05 DIAGNOSIS — G57.62 MORTON'S NEUROMA OF LEFT FOOT: ICD-10-CM

## 2020-11-05 DIAGNOSIS — M20.12 VALGUS DEFORMITY OF BOTH GREAT TOES: Primary | ICD-10-CM

## 2020-11-05 DIAGNOSIS — M20.11 VALGUS DEFORMITY OF BOTH GREAT TOES: Primary | ICD-10-CM

## 2020-11-05 PROCEDURE — 99203 OFFICE O/P NEW LOW 30 MIN: CPT | Performed by: PODIATRIST

## 2020-11-05 PROCEDURE — 99072 ADDL SUPL MATRL&STAF TM PHE: CPT | Performed by: PODIATRIST

## 2020-11-05 NOTE — PROGRESS NOTES
Liliana Pickett is a 50year old female. Patient presents with:  New Patient: Consult on bunions bilateral feet - not too painful, but hard to find shoes - some tingling by toes left foot.         HPI:   The patient presents today for initial evaluation Topics      Concerns:        Caffeine Concern: Yes        Exercise: Yes    Social History Narrative      ** Merged History Encounter **                 REVIEW OF SYSTEMS:   Review of Systems    Today reviewed systems as documented below  GENERAL HEALTH: fe of the perioperative course, possible complications, and alternate forms of care; these were all discussed with the patient. The consultation sheet was reviewed with the patient in detail.  All questions were answered and the patient signed the consultation

## 2020-11-09 ENCOUNTER — LAB ENCOUNTER (OUTPATIENT)
Dept: LAB | Age: 45
End: 2020-11-09
Attending: FAMILY MEDICINE
Payer: COMMERCIAL

## 2020-11-09 DIAGNOSIS — E55.9 VITAMIN D DEFICIENCY: ICD-10-CM

## 2020-11-09 DIAGNOSIS — R74.8 ELEVATED LIVER ENZYMES: ICD-10-CM

## 2020-11-09 PROCEDURE — 82306 VITAMIN D 25 HYDROXY: CPT

## 2020-11-09 PROCEDURE — 80053 COMPREHEN METABOLIC PANEL: CPT

## 2020-11-09 PROCEDURE — 36415 COLL VENOUS BLD VENIPUNCTURE: CPT

## 2020-12-07 ENCOUNTER — OFFICE VISIT (OUTPATIENT)
Dept: PODIATRY CLINIC | Facility: CLINIC | Age: 45
End: 2020-12-07
Payer: COMMERCIAL

## 2020-12-07 VITALS — HEIGHT: 63 IN | BODY MASS INDEX: 22.15 KG/M2 | WEIGHT: 125 LBS

## 2020-12-07 DIAGNOSIS — G57.61 MORTON'S NEUROMA OF RIGHT FOOT: Primary | ICD-10-CM

## 2020-12-07 DIAGNOSIS — M79.671 RIGHT FOOT PAIN: ICD-10-CM

## 2020-12-07 DIAGNOSIS — M20.12 VALGUS DEFORMITY OF BOTH GREAT TOES: ICD-10-CM

## 2020-12-07 DIAGNOSIS — M20.11 VALGUS DEFORMITY OF BOTH GREAT TOES: ICD-10-CM

## 2020-12-07 DIAGNOSIS — D36.10 NEUROMA: ICD-10-CM

## 2020-12-07 PROCEDURE — 99213 OFFICE O/P EST LOW 20 MIN: CPT | Performed by: PODIATRIST

## 2020-12-07 PROCEDURE — 3008F BODY MASS INDEX DOCD: CPT | Performed by: PODIATRIST

## 2020-12-08 NOTE — PROGRESS NOTES
Kenya Bartlett is a 50year old female. Patient presents with:   Follow - Up: bilateral bunions ,insoles did not help ,wants to discuss surgery         HPI:     Patient presents today for follow-up we did try some temporary orthotics Clair garza did Sexual Activity      Alcohol use: Never        Frequency: Never      Drug use: Never    Other Topics      Concerns:        Caffeine Concern: Yes        Exercise: Yes    Social History Narrative      ** Merged History Encounter **                 REVIEW OF of year. The patient indicates understanding of these issues and agrees to the plan. Return for Appropriate testing has been performed.     Ebony Wilson DPM  12/8/2020

## 2020-12-29 ENCOUNTER — OFFICE VISIT (OUTPATIENT)
Dept: PODIATRY CLINIC | Facility: CLINIC | Age: 45
End: 2020-12-29
Payer: COMMERCIAL

## 2020-12-29 VITALS — HEIGHT: 63 IN | BODY MASS INDEX: 22.15 KG/M2 | WEIGHT: 125 LBS

## 2020-12-29 DIAGNOSIS — M19.071 PRIMARY OSTEOARTHRITIS OF BOTH FEET: ICD-10-CM

## 2020-12-29 DIAGNOSIS — M19.072 PRIMARY OSTEOARTHRITIS OF BOTH FEET: ICD-10-CM

## 2020-12-29 DIAGNOSIS — M20.12 VALGUS DEFORMITY OF BOTH GREAT TOES: Primary | ICD-10-CM

## 2020-12-29 DIAGNOSIS — M20.11 VALGUS DEFORMITY OF BOTH GREAT TOES: Primary | ICD-10-CM

## 2020-12-29 PROCEDURE — 3008F BODY MASS INDEX DOCD: CPT | Performed by: PODIATRIST

## 2020-12-29 PROCEDURE — 99213 OFFICE O/P EST LOW 20 MIN: CPT | Performed by: PODIATRIST

## 2020-12-29 NOTE — PROGRESS NOTES
Elsa Walter is a 50year old female.  Patient presents with:  Test Results: US both feet        HPI:     Presents today for follow-up evaluation she had diagnostic ultrasound done on both feet findings are consistent with a osteoarthritis of the seco Never      Drug use: Never    Other Topics      Concerns:        Caffeine Concern: Yes        Exercise: Yes    Social History Narrative      ** Merged History Encounter **                 REVIEW OF SYSTEMS:   Review of Systems    Today reviewed systems as second MP joint to relieve some of the arthritic symptoms that may be present. This could be done at the time of surgery while she is under anesthesia.   Patient will contact us if she desires to proceed with surgery    The patient indicates understanding

## 2020-12-31 ENCOUNTER — TELEPHONE (OUTPATIENT)
Dept: PODIATRY CLINIC | Facility: CLINIC | Age: 45
End: 2020-12-31

## 2020-12-31 NOTE — TELEPHONE ENCOUNTER
Patient called in requesting surgery be scheduled for her left foot which was done so this date. Patient will undergo Z bunionectomy with possible phalangeal osteotomy, left foot at [facility].   Reviewed pre and post op instructions with patient who voiced unde

## 2021-01-05 ENCOUNTER — LAB REQUISITION (OUTPATIENT)
Dept: LAB | Facility: HOSPITAL | Age: 46
End: 2021-01-05
Payer: COMMERCIAL

## 2021-01-05 DIAGNOSIS — Z01.818 ENCOUNTER FOR OTHER PREPROCEDURAL EXAMINATION: ICD-10-CM

## 2021-01-07 LAB — SARS-COV-2 RNA RESP QL NAA+PROBE: NOT DETECTED

## 2021-01-09 ENCOUNTER — TELEPHONE (OUTPATIENT)
Dept: PODIATRY CLINIC | Facility: CLINIC | Age: 46
End: 2021-01-09

## 2021-01-09 NOTE — TELEPHONE ENCOUNTER
Procedure date:1/8/21  How are you feeling? Feeling fine  Any bleeding? No  Is the dressing dry & intact? Yes  Level of pain? 4/10  Character of pain: aching  Alleviating factors: elevating and icing  Are you taking the prescribed medication?  Yes  Are you

## 2021-01-11 ENCOUNTER — OFFICE VISIT (OUTPATIENT)
Dept: PODIATRY CLINIC | Facility: CLINIC | Age: 46
End: 2021-01-11
Payer: COMMERCIAL

## 2021-01-11 DIAGNOSIS — M20.12 VALGUS DEFORMITY OF BOTH GREAT TOES: Primary | ICD-10-CM

## 2021-01-11 DIAGNOSIS — M20.12 HALLUX VALGUS OF LEFT FOOT: ICD-10-CM

## 2021-01-11 DIAGNOSIS — M20.11 VALGUS DEFORMITY OF BOTH GREAT TOES: Primary | ICD-10-CM

## 2021-01-11 PROCEDURE — 99024 POSTOP FOLLOW-UP VISIT: CPT | Performed by: PODIATRIST

## 2021-01-11 RX ORDER — HYDROCODONE BITARTRATE AND ACETAMINOPHEN 5; 325 MG/1; MG/1
1-2 TABLET ORAL EVERY 4 HOURS PRN
COMMUNITY
Start: 2021-01-08 | End: 2021-01-25

## 2021-01-11 RX ORDER — IBUPROFEN 600 MG/1
TABLET ORAL
COMMUNITY
Start: 2021-01-08 | End: 2021-01-25

## 2021-01-11 NOTE — PROGRESS NOTES
Jordan Saunders is a 50year old female. Patient presents with:  Post-Op: Left foot sx on 1/8/21 - pain is getting better since Saturday - taking ibuprofen for pain.         HPI:     Here for follow-up on her surgery from 1/8/2021 she has a Z bunionectom Sexual Activity      Alcohol use: Never        Frequency: Never      Drug use: Never    Other Topics      Concerns:        Caffeine Concern: Yes        Exercise: Yes    Social History Narrative      ** Merged History Encounter **                 REVIEW OF

## 2021-01-25 ENCOUNTER — OFFICE VISIT (OUTPATIENT)
Dept: PODIATRY CLINIC | Facility: CLINIC | Age: 46
End: 2021-01-25
Payer: COMMERCIAL

## 2021-01-25 DIAGNOSIS — M20.12 HALLUX VALGUS OF LEFT FOOT: Primary | ICD-10-CM

## 2021-01-25 PROCEDURE — 99024 POSTOP FOLLOW-UP VISIT: CPT | Performed by: PODIATRIST

## 2021-01-25 NOTE — PROGRESS NOTES
Florencia Alvares is a 50year old female. Patient presents with:  Post-Op: left foot sx on 1/8/21 - denies taking pain medication - pain scale at times 3/10. HPI:     Patient presents today for follow-up evaluation 2-1/2 weeks status post surgery. History Narrative      ** Merged History Encounter **                 REVIEW OF SYSTEMS:   Review of Systems    Today reviewed systems as documented below  GENERAL HEALTH: feels well otherwise  SKIN: denies any unusual skin lesions or rashes  RESPIRATORY:

## 2021-02-15 ENCOUNTER — OFFICE VISIT (OUTPATIENT)
Dept: PODIATRY CLINIC | Facility: CLINIC | Age: 46
End: 2021-02-15
Payer: COMMERCIAL

## 2021-02-15 DIAGNOSIS — M20.11 VALGUS DEFORMITY OF BOTH GREAT TOES: ICD-10-CM

## 2021-02-15 DIAGNOSIS — M20.12 VALGUS DEFORMITY OF BOTH GREAT TOES: ICD-10-CM

## 2021-02-15 DIAGNOSIS — M20.12 HALLUX VALGUS OF LEFT FOOT: Primary | ICD-10-CM

## 2021-02-15 PROCEDURE — 99213 OFFICE O/P EST LOW 20 MIN: CPT | Performed by: PODIATRIST

## 2021-02-15 PROCEDURE — 73620 X-RAY EXAM OF FOOT: CPT | Performed by: PODIATRIST

## 2021-02-15 NOTE — PROGRESS NOTES
Jordan Saunders is a 50year old female. Patient presents with:  Post-Op: left foot sx on 1/8/21 - pain scale 1/10 - denies taking pain medication.         HPI:     Patient presents today 5 weeks status post surgery on the left foot is now interested in Concern: Yes        Exercise: Yes    Social History Narrative      ** Merged History Encounter **                 REVIEW OF SYSTEMS:   Review of Systems    Today reviewed systems as documented below  GENERAL HEALTH: feels well otherwise  SKIN: denies any u course, possible complications, and alternate forms of care; these were all discussed with the patient. The consultation sheet was reviewed with the patient in detail. All questions were answered and the patient signed the consultation sheet.  The patient u

## 2021-02-16 ENCOUNTER — TELEPHONE (OUTPATIENT)
Dept: PODIATRY CLINIC | Facility: CLINIC | Age: 46
End: 2021-02-16

## 2021-02-16 DIAGNOSIS — M79.671 PAIN IN RIGHT FOOT: ICD-10-CM

## 2021-02-16 DIAGNOSIS — M20.11 HALLUX VALGUS, RIGHT: Primary | ICD-10-CM

## 2021-02-16 NOTE — TELEPHONE ENCOUNTER
Confirmation received from [facility] this date for 3/17/21 at 10:15 am.  Called patient and reviewed pre and post op instructions. Patient voiced understanding. Also sent instructions thru My Chart this date.

## 2021-02-16 NOTE — TELEPHONE ENCOUNTER
Patient phoned in this date requesting surgery for 3/17/21 which was scheduled this date at [facility] for Z Bunionectomy with possible phalangeal osteotomy, right foot with medartis plate and screws.   Patient was told I would call back and review all details wh

## 2021-03-01 ENCOUNTER — OFFICE VISIT (OUTPATIENT)
Dept: OBGYN CLINIC | Facility: CLINIC | Age: 46
End: 2021-03-01
Payer: COMMERCIAL

## 2021-03-01 VITALS
SYSTOLIC BLOOD PRESSURE: 112 MMHG | HEART RATE: 56 BPM | HEIGHT: 63 IN | WEIGHT: 133 LBS | DIASTOLIC BLOOD PRESSURE: 60 MMHG | BODY MASS INDEX: 23.57 KG/M2

## 2021-03-01 DIAGNOSIS — N87.0 CIN I (CERVICAL INTRAEPITHELIAL NEOPLASIA I): Primary | ICD-10-CM

## 2021-03-01 PROCEDURE — 3008F BODY MASS INDEX DOCD: CPT | Performed by: OBSTETRICS & GYNECOLOGY

## 2021-03-01 PROCEDURE — 3074F SYST BP LT 130 MM HG: CPT | Performed by: OBSTETRICS & GYNECOLOGY

## 2021-03-01 PROCEDURE — 88175 CYTOPATH C/V AUTO FLUID REDO: CPT | Performed by: OBSTETRICS & GYNECOLOGY

## 2021-03-01 PROCEDURE — 87624 HPV HI-RISK TYP POOLED RSLT: CPT | Performed by: OBSTETRICS & GYNECOLOGY

## 2021-03-01 PROCEDURE — 3078F DIAST BP <80 MM HG: CPT | Performed by: OBSTETRICS & GYNECOLOGY

## 2021-03-01 PROCEDURE — 99213 OFFICE O/P EST LOW 20 MIN: CPT | Performed by: OBSTETRICS & GYNECOLOGY

## 2021-03-01 NOTE — PROGRESS NOTES
Johan Rosen is a 50year old female  Patient's last menstrual period was 2020 (exact date). Patient presents with: Follow Up Pap: Cryo 2020  .   Patient had abnormal pap smear  - LGSIL, followed by colpo and cryo for IAIN 1, f/u pa Social connections        Talks on phone: Not on file        Gets together: Not on file        Attends Zoroastrian service: Not on file        Active member of club or organization: Not on file        Attends meetings of clubs or organizations: Not on albino Disp: , Rfl:   •  Tretinoin 0.01 % External Gel, as needed.   , Disp: , Rfl:     ALLERGIES:    Amoxicillin             RASH      PHYSICAL EXAM:   Pelvic Exam:  External Genitalia: normal appearance, hair distribution, and no lesions  Urethral Meatus:  elo

## 2021-03-02 LAB — HPV I/H RISK 1 DNA SPEC QL NAA+PROBE: NEGATIVE

## 2021-03-14 ENCOUNTER — LAB REQUISITION (OUTPATIENT)
Dept: LAB | Facility: HOSPITAL | Age: 46
End: 2021-03-14
Payer: COMMERCIAL

## 2021-03-14 DIAGNOSIS — Z01.818 ENCOUNTER FOR OTHER PREPROCEDURAL EXAMINATION: ICD-10-CM

## 2021-03-14 LAB — SARS-COV-2 RNA RESP QL NAA+PROBE: NOT DETECTED

## 2021-03-18 ENCOUNTER — TELEPHONE (OUTPATIENT)
Dept: PODIATRY CLINIC | Facility: CLINIC | Age: 46
End: 2021-03-18

## 2021-03-18 NOTE — TELEPHONE ENCOUNTER
Procedure date: 3/17/21, Bunionectomy right foot with medartis plate and screws. Per patient feeling well, tolerable pain. Denies any fevers/chills, bleeding, numbness or tingling. Dressing is dry and intact. Level of pain: 5/10 at this time. Alleviating factors: elevating foot, staying off foot, pain mediations. Are you taking the prescribed medications? Yes, Norco last taken a couple minutes ago. Are you following all of the PO instructions? Yes      Follow up appt date: 3/22/21    Pt was advised if they have any concerns after hours to call our office and they would be directed to on call physician.

## 2021-03-22 ENCOUNTER — OFFICE VISIT (OUTPATIENT)
Dept: PODIATRY CLINIC | Facility: CLINIC | Age: 46
End: 2021-03-22
Payer: COMMERCIAL

## 2021-03-22 VITALS — HEIGHT: 63 IN | WEIGHT: 133 LBS | BODY MASS INDEX: 23.57 KG/M2

## 2021-03-22 DIAGNOSIS — M20.12 HALLUX VALGUS OF LEFT FOOT: ICD-10-CM

## 2021-03-22 DIAGNOSIS — M20.11 HALLUX VALGUS, RIGHT: Primary | ICD-10-CM

## 2021-03-22 PROCEDURE — 99024 POSTOP FOLLOW-UP VISIT: CPT | Performed by: PODIATRIST

## 2021-03-22 PROCEDURE — 3008F BODY MASS INDEX DOCD: CPT | Performed by: PODIATRIST

## 2021-03-22 NOTE — PROGRESS NOTES
Raymond Wagner is a 50year old female.  Patient presents with:  Post-Op: bunionectomy of the right foot ,pain has been as high as a 7/10 ,,left foot is doing well mild occ tenderness at the end of the day         HPI:     Patient presents today doing w Alcohol use: Never      Drug use: Never    Other Topics      Concerns:        Caffeine Concern: Yes        Exercise: Yes    Social History Narrative      ** Merged History Encounter **           Social Determinants of Health  Financial Resource Strain:     ankle.   4. Neurological: Normal sharp dull sensation; reflexes normal.  Incision line well coapted no sign infection or dehiscence        ASSESSMENT AND PLAN:   Diagnoses and all orders for this visit:    Hallux valgus, right  -     XR FOOT WEIGHTBEARING

## 2021-04-01 ENCOUNTER — OFFICE VISIT (OUTPATIENT)
Dept: PODIATRY CLINIC | Facility: CLINIC | Age: 46
End: 2021-04-01
Payer: COMMERCIAL

## 2021-04-01 DIAGNOSIS — M20.11 HALLUX VALGUS, RIGHT: Primary | ICD-10-CM

## 2021-04-01 PROCEDURE — 99024 POSTOP FOLLOW-UP VISIT: CPT | Performed by: PODIATRIST

## 2021-04-01 NOTE — PROGRESS NOTES
Kristyn Aldrich is a 50year old female. Patient presents with:  Post-Op: s/p bunionectomy right foot -- Sx on 03/17/21. Rates pain 2/10. No pain meds taken. Denies fever or calf pain.          HPI:     Is a little over 2 weeks status post surgery doing Alcohol use: Never      Drug use: Never    Other Topics      Concerns:        Caffeine Concern: Yes        Exercise: Yes    Social History Narrative      ** Merged History Encounter **           Social Determinants of Health  Financial Resource Strain:     normal.  Today she is got well coapted incision moderate swelling she is ambulating in a cast boot        ASSESSMENT AND PLAN:   Diagnoses and all orders for this visit:    Hallux valgus, right  -     XR FOOT WEIGHTBEARING (2 VIEWS), RIGHT   (CPT=73620)

## 2021-04-22 ENCOUNTER — OFFICE VISIT (OUTPATIENT)
Dept: PODIATRY CLINIC | Facility: CLINIC | Age: 46
End: 2021-04-22
Payer: COMMERCIAL

## 2021-04-22 DIAGNOSIS — M20.11 HALLUX VALGUS, RIGHT: Primary | ICD-10-CM

## 2021-04-22 PROCEDURE — 99024 POSTOP FOLLOW-UP VISIT: CPT | Performed by: PODIATRIST

## 2021-04-26 NOTE — PROGRESS NOTES
Leonel Lizarraga is a 50year old female. Patient presents with:  Post-Op: right foot sx on 3/17/21 - on and off pain scale 3/10. HPI:   Patient is 1 month status post surgery on her right foot everything appears to be healing uneventfully.   She h Tobacco Use      Smoking status: Never Smoker      Smokeless tobacco: Never Used    Vaping Use      Vaping Use: Never used    Substance and Sexual Activity      Alcohol use: Never      Drug use: Never    Other Topics      Concerns:        Caffeine Concern:

## 2021-05-04 ENCOUNTER — TELEPHONE (OUTPATIENT)
Dept: PODIATRY CLINIC | Facility: CLINIC | Age: 46
End: 2021-05-04

## 2021-05-04 NOTE — TELEPHONE ENCOUNTER
Would like a letter to say she is not cleared to do physical activity. This is for a gym membership.  Please give her a call

## 2021-05-13 ENCOUNTER — OFFICE VISIT (OUTPATIENT)
Dept: PODIATRY CLINIC | Facility: CLINIC | Age: 46
End: 2021-05-13
Payer: COMMERCIAL

## 2021-05-13 VITALS — BODY MASS INDEX: 23.04 KG/M2 | WEIGHT: 130 LBS | HEIGHT: 63 IN

## 2021-05-13 DIAGNOSIS — M20.11 HALLUX VALGUS, RIGHT: ICD-10-CM

## 2021-05-13 DIAGNOSIS — M20.12 HALLUX VALGUS OF LEFT FOOT: ICD-10-CM

## 2021-05-13 DIAGNOSIS — Z98.890 STATUS POST FOOT SURGERY: Primary | ICD-10-CM

## 2021-05-13 PROCEDURE — 3008F BODY MASS INDEX DOCD: CPT | Performed by: PODIATRIST

## 2021-05-13 PROCEDURE — 99024 POSTOP FOLLOW-UP VISIT: CPT | Performed by: PODIATRIST

## 2021-05-16 NOTE — PROGRESS NOTES
Leonel Lizarraga is a 50year old female.  Patient presents with:  Post-Op:  sx 3/17/21 bunionectomy of the right foot ,patient denies pain today         HPI:   Patient returns to the clinic for follow-up on her right foot bunion surgery she is not having Smoking status: Never Smoker      Smokeless tobacco: Never Used    Vaping Use      Vaping Use: Never used    Substance and Sexual Activity      Alcohol use: Never      Drug use: Never    Other Topics      Concerns:        Caffeine Concern: Yes        Exerc

## 2021-06-01 ENCOUNTER — OFFICE VISIT (OUTPATIENT)
Dept: PODIATRY CLINIC | Facility: CLINIC | Age: 46
End: 2021-06-01
Payer: COMMERCIAL

## 2021-06-01 DIAGNOSIS — Z98.890 STATUS POST FOOT SURGERY: ICD-10-CM

## 2021-06-01 DIAGNOSIS — Z47.89 ORTHOPEDIC AFTERCARE: Primary | ICD-10-CM

## 2021-06-01 PROCEDURE — 99024 POSTOP FOLLOW-UP VISIT: CPT | Performed by: PODIATRIST

## 2021-06-02 NOTE — PROGRESS NOTES
Yoel Flores is a 50year old female. Patient presents with:  Post-Op: sx 3/17/21. pain 1/10, discomfort when walking. denies swelling/fevers, chills.         HPI:   Patient is now 3 months status post surgery overall doing well at today's visit antonia Use      Vaping Use: Never used    Substance and Sexual Activity      Alcohol use: Never      Drug use: Never    Other Topics      Concerns:        Caffeine Concern: Yes        Exercise: Yes          REVIEW OF SYSTEMS:   Today reviewed systens as documente

## 2021-07-22 ENCOUNTER — OFFICE VISIT (OUTPATIENT)
Dept: FAMILY MEDICINE CLINIC | Facility: CLINIC | Age: 46
End: 2021-07-22
Payer: COMMERCIAL

## 2021-07-22 VITALS
WEIGHT: 143 LBS | RESPIRATION RATE: 16 BRPM | SYSTOLIC BLOOD PRESSURE: 102 MMHG | HEART RATE: 50 BPM | HEIGHT: 64 IN | BODY MASS INDEX: 24.41 KG/M2 | OXYGEN SATURATION: 98 % | DIASTOLIC BLOOD PRESSURE: 68 MMHG

## 2021-07-22 DIAGNOSIS — Z00.00 ANNUAL PHYSICAL EXAM: Primary | ICD-10-CM

## 2021-07-22 DIAGNOSIS — Z12.31 ENCOUNTER FOR SCREENING MAMMOGRAM FOR HIGH-RISK PATIENT: ICD-10-CM

## 2021-07-22 DIAGNOSIS — Z12.11 COLON CANCER SCREENING: ICD-10-CM

## 2021-07-22 DIAGNOSIS — Z80.0 FAMILY HISTORY OF COLON CANCER: ICD-10-CM

## 2021-07-22 PROCEDURE — 99396 PREV VISIT EST AGE 40-64: CPT | Performed by: FAMILY MEDICINE

## 2021-07-22 PROCEDURE — 3008F BODY MASS INDEX DOCD: CPT | Performed by: FAMILY MEDICINE

## 2021-07-22 PROCEDURE — 3078F DIAST BP <80 MM HG: CPT | Performed by: FAMILY MEDICINE

## 2021-07-22 PROCEDURE — 3074F SYST BP LT 130 MM HG: CPT | Performed by: FAMILY MEDICINE

## 2021-07-22 NOTE — PROGRESS NOTES
HPI:   Elsa Walter is a 50year old female who presents for a complete physical exam.       Wt Readings from Last 6 Encounters:  07/22/21 : 143 lb (64.9 kg)  05/13/21 : 130 lb (59 kg)  03/22/21 : 133 lb (60.3 kg)  03/01/21 : 133 lb (60.3 kg)  12/29/ IAIN 1   • CRYOCAUTERY OF CERVIX  08/24/2020    Cryo   • OTHER SURGICAL HISTORY  child    foot surgery   • WISDOM TEETH REMOVED        Family History   Problem Relation Age of Onset   • Hypertension Father    • Lipids Father    • Diabetes Father    • Hypert clear  SKIN: norashes,no suspicious lesions  GI: good BS's,no masses, HSM or tenderness  CHEST: no chest tenderness  BREAST: no axillary LAD, no masses no nipple discharge bilaterally  MUSCULOSKELETAL: back is not tender,FROM of the back  EXTREMITIES: no c

## 2021-08-03 ENCOUNTER — LAB ENCOUNTER (OUTPATIENT)
Dept: LAB | Age: 46
End: 2021-08-03
Attending: FAMILY MEDICINE
Payer: COMMERCIAL

## 2021-08-03 ENCOUNTER — TELEPHONE (OUTPATIENT)
Dept: OBGYN CLINIC | Facility: CLINIC | Age: 46
End: 2021-08-03

## 2021-08-03 DIAGNOSIS — Z00.00 ANNUAL PHYSICAL EXAM: ICD-10-CM

## 2021-08-03 LAB
ALBUMIN SERPL-MCNC: 3.5 G/DL (ref 3.4–5)
ALBUMIN/GLOB SERPL: 1.1 {RATIO} (ref 1–2)
ALP LIVER SERPL-CCNC: 42 U/L
ALT SERPL-CCNC: 23 U/L
ANION GAP SERPL CALC-SCNC: 7 MMOL/L (ref 0–18)
AST SERPL-CCNC: 21 U/L (ref 15–37)
BASOPHILS # BLD AUTO: 0.06 X10(3) UL (ref 0–0.2)
BASOPHILS NFR BLD AUTO: 1.1 %
BILIRUB SERPL-MCNC: 0.5 MG/DL (ref 0.1–2)
BUN BLD-MCNC: 13 MG/DL (ref 7–18)
CALCIUM BLD-MCNC: 8.1 MG/DL (ref 8.5–10.1)
CHLORIDE SERPL-SCNC: 106 MMOL/L (ref 98–112)
CHOLEST SMN-MCNC: 193 MG/DL (ref ?–200)
CO2 SERPL-SCNC: 26 MMOL/L (ref 21–32)
CREAT BLD-MCNC: 0.74 MG/DL
EOSINOPHIL # BLD AUTO: 0.08 X10(3) UL (ref 0–0.7)
EOSINOPHIL NFR BLD AUTO: 1.4 %
ERYTHROCYTE [DISTWIDTH] IN BLOOD BY AUTOMATED COUNT: 13 %
GLOBULIN PLAS-MCNC: 3.2 G/DL (ref 2.8–4.4)
GLUCOSE BLD-MCNC: 79 MG/DL (ref 70–99)
HBV SURFACE AB SER QL: NONREACTIVE
HBV SURFACE AB SERPL IA-ACNC: <3.1 MIU/ML
HCT VFR BLD AUTO: 40.8 %
HDLC SERPL-MCNC: 57 MG/DL (ref 40–59)
HGB BLD-MCNC: 13.1 G/DL
IMM GRANULOCYTES # BLD AUTO: 0.01 X10(3) UL (ref 0–1)
IMM GRANULOCYTES NFR BLD: 0.2 %
LDLC SERPL CALC-MCNC: 123 MG/DL (ref ?–100)
LYMPHOCYTES # BLD AUTO: 1.78 X10(3) UL (ref 1–4)
LYMPHOCYTES NFR BLD AUTO: 31.7 %
M PROTEIN MFR SERPL ELPH: 6.7 G/DL (ref 6.4–8.2)
MCH RBC QN AUTO: 31 PG (ref 26–34)
MCHC RBC AUTO-ENTMCNC: 32.1 G/DL (ref 31–37)
MCV RBC AUTO: 96.7 FL
MONOCYTES # BLD AUTO: 0.67 X10(3) UL (ref 0.1–1)
MONOCYTES NFR BLD AUTO: 11.9 %
NEUTROPHILS # BLD AUTO: 3.02 X10 (3) UL (ref 1.5–7.7)
NEUTROPHILS # BLD AUTO: 3.02 X10(3) UL (ref 1.5–7.7)
NEUTROPHILS NFR BLD AUTO: 53.7 %
NONHDLC SERPL-MCNC: 136 MG/DL (ref ?–130)
OSMOLALITY SERPL CALC.SUM OF ELEC: 287 MOSM/KG (ref 275–295)
PATIENT FASTING Y/N/NP: YES
PATIENT FASTING Y/N/NP: YES
PLATELET # BLD AUTO: 286 10(3)UL (ref 150–450)
POTASSIUM SERPL-SCNC: 3.9 MMOL/L (ref 3.5–5.1)
RBC # BLD AUTO: 4.22 X10(6)UL
SODIUM SERPL-SCNC: 139 MMOL/L (ref 136–145)
T4 FREE SERPL-MCNC: 1 NG/DL (ref 0.8–1.7)
TRIGL SERPL-MCNC: 68 MG/DL (ref 30–149)
TSI SER-ACNC: 1.39 MIU/ML (ref 0.36–3.74)
VIT B12 SERPL-MCNC: 967 PG/ML (ref 193–986)
VIT D+METAB SERPL-MCNC: 74.2 NG/ML (ref 30–100)
VLDLC SERPL CALC-MCNC: 12 MG/DL (ref 0–30)
WBC # BLD AUTO: 5.6 X10(3) UL (ref 4–11)

## 2021-08-03 PROCEDURE — 82306 VITAMIN D 25 HYDROXY: CPT | Performed by: FAMILY MEDICINE

## 2021-08-03 PROCEDURE — 82607 VITAMIN B-12: CPT | Performed by: FAMILY MEDICINE

## 2021-08-03 PROCEDURE — 80050 GENERAL HEALTH PANEL: CPT | Performed by: FAMILY MEDICINE

## 2021-08-03 PROCEDURE — 86706 HEP B SURFACE ANTIBODY: CPT | Performed by: FAMILY MEDICINE

## 2021-08-03 PROCEDURE — 80061 LIPID PANEL: CPT | Performed by: FAMILY MEDICINE

## 2021-08-03 PROCEDURE — 84439 ASSAY OF FREE THYROXINE: CPT | Performed by: FAMILY MEDICINE

## 2021-08-12 ENCOUNTER — HOSPITAL ENCOUNTER (OUTPATIENT)
Dept: MAMMOGRAPHY | Age: 46
Discharge: HOME | End: 2021-08-12
Attending: FAMILY MEDICINE
Payer: COMMERCIAL

## 2021-08-12 DIAGNOSIS — Z12.31 ENCOUNTER FOR SCREENING MAMMOGRAM FOR HIGH-RISK PATIENT: ICD-10-CM

## 2021-08-12 PROCEDURE — 77067 SCR MAMMO BI INCL CAD: CPT | Performed by: FAMILY MEDICINE

## 2021-08-12 PROCEDURE — 77063 BREAST TOMOSYNTHESIS BI: CPT | Performed by: FAMILY MEDICINE

## 2021-09-16 ENCOUNTER — OFFICE VISIT (OUTPATIENT)
Dept: OBGYN CLINIC | Facility: CLINIC | Age: 46
End: 2021-09-16
Payer: COMMERCIAL

## 2021-09-16 VITALS
BODY MASS INDEX: 25.16 KG/M2 | WEIGHT: 142 LBS | HEART RATE: 50 BPM | SYSTOLIC BLOOD PRESSURE: 102 MMHG | HEIGHT: 63 IN | DIASTOLIC BLOOD PRESSURE: 64 MMHG

## 2021-09-16 DIAGNOSIS — N87.0 DYSPLASIA OF CERVIX, LOW GRADE (CIN 1): Primary | ICD-10-CM

## 2021-09-16 PROCEDURE — 87624 HPV HI-RISK TYP POOLED RSLT: CPT | Performed by: OBSTETRICS & GYNECOLOGY

## 2021-09-16 PROCEDURE — 99213 OFFICE O/P EST LOW 20 MIN: CPT | Performed by: OBSTETRICS & GYNECOLOGY

## 2021-09-16 PROCEDURE — 88175 CYTOPATH C/V AUTO FLUID REDO: CPT | Performed by: OBSTETRICS & GYNECOLOGY

## 2021-09-16 PROCEDURE — 3008F BODY MASS INDEX DOCD: CPT | Performed by: OBSTETRICS & GYNECOLOGY

## 2021-09-16 PROCEDURE — 3078F DIAST BP <80 MM HG: CPT | Performed by: OBSTETRICS & GYNECOLOGY

## 2021-09-16 PROCEDURE — 3074F SYST BP LT 130 MM HG: CPT | Performed by: OBSTETRICS & GYNECOLOGY

## 2021-09-16 NOTE — PROGRESS NOTES
Susan Alford is a 50year old female  Patient's last menstrual period was 2021 (exact date). Patient presents with: Other: 6 month pap smear  .   Patient has no complaints, IAIN 1  , cryo and normal pap smear 6 months ago  OBSTETRICS Helmet: Not Asked        Seat Belt: Not Asked        Self-Exams: Not Asked    Social History Narrative      ** Merged History Encounter **           Social Determinants of Health  Financial Resource Strain:       Difficulty of Paying Living Expenses: Not o Outpatient Medications:   •  triamcinolone acetonide 0.1 % External Cream, Apply thin smear to rash TID x 7-10 days. , Disp: 30 g, Rfl: 0  •  Multiple Vitamins-Minerals (MULTIVITAMIN OR), Take by mouth., Disp: , Rfl:   •  CALCIUM OR, Take by mouth., Disp: ,

## 2021-09-17 LAB — HPV I/H RISK 1 DNA SPEC QL NAA+PROBE: NEGATIVE

## 2022-09-06 ENCOUNTER — OFFICE VISIT (OUTPATIENT)
Dept: FAMILY MEDICINE CLINIC | Facility: CLINIC | Age: 47
End: 2022-09-06
Payer: COMMERCIAL

## 2022-09-06 VITALS
DIASTOLIC BLOOD PRESSURE: 76 MMHG | RESPIRATION RATE: 18 BRPM | HEIGHT: 63 IN | SYSTOLIC BLOOD PRESSURE: 116 MMHG | WEIGHT: 143 LBS | HEART RATE: 55 BPM | BODY MASS INDEX: 25.34 KG/M2 | OXYGEN SATURATION: 99 %

## 2022-09-06 DIAGNOSIS — Z12.11 COLON CANCER SCREENING: ICD-10-CM

## 2022-09-06 DIAGNOSIS — Z00.00 ANNUAL PHYSICAL EXAM: ICD-10-CM

## 2022-09-06 DIAGNOSIS — Z01.419 WELL WOMAN EXAM WITH ROUTINE GYNECOLOGICAL EXAM: Primary | ICD-10-CM

## 2022-09-06 DIAGNOSIS — Z80.0 FAMILY HISTORY OF COLON CANCER: ICD-10-CM

## 2022-09-06 DIAGNOSIS — R87.619 ABNORMAL CERVICAL PAPANICOLAOU SMEAR, UNSPECIFIED ABNORMAL PAP FINDING: ICD-10-CM

## 2022-09-06 DIAGNOSIS — Z12.31 ENCOUNTER FOR SCREENING MAMMOGRAM FOR HIGH-RISK PATIENT: ICD-10-CM

## 2022-09-06 PROCEDURE — 87624 HPV HI-RISK TYP POOLED RSLT: CPT | Performed by: FAMILY MEDICINE

## 2022-09-06 PROCEDURE — 99396 PREV VISIT EST AGE 40-64: CPT | Performed by: FAMILY MEDICINE

## 2022-09-06 PROCEDURE — 3074F SYST BP LT 130 MM HG: CPT | Performed by: FAMILY MEDICINE

## 2022-09-06 PROCEDURE — 3008F BODY MASS INDEX DOCD: CPT | Performed by: FAMILY MEDICINE

## 2022-09-06 PROCEDURE — 3078F DIAST BP <80 MM HG: CPT | Performed by: FAMILY MEDICINE

## 2022-09-07 LAB — HPV I/H RISK 1 DNA SPEC QL NAA+PROBE: NEGATIVE

## 2022-09-09 ENCOUNTER — HOSPITAL ENCOUNTER (OUTPATIENT)
Dept: MAMMOGRAPHY | Age: 47
Discharge: HOME | End: 2022-09-09
Attending: FAMILY MEDICINE
Payer: COMMERCIAL

## 2022-09-09 DIAGNOSIS — R92.2 DENSE BREAST TISSUE ON MAMMOGRAM: Primary | ICD-10-CM

## 2022-09-09 DIAGNOSIS — Z12.31 ENCOUNTER FOR SCREENING MAMMOGRAM FOR HIGH-RISK PATIENT: ICD-10-CM

## 2022-09-09 PROCEDURE — 77063 BREAST TOMOSYNTHESIS BI: CPT | Performed by: FAMILY MEDICINE

## 2022-09-09 PROCEDURE — 77067 SCR MAMMO BI INCL CAD: CPT | Performed by: FAMILY MEDICINE

## 2022-09-12 ENCOUNTER — LABORATORY ENCOUNTER (OUTPATIENT)
Dept: LAB | Age: 47
End: 2022-09-12
Attending: FAMILY MEDICINE
Payer: COMMERCIAL

## 2022-09-12 DIAGNOSIS — Z00.00 ANNUAL PHYSICAL EXAM: ICD-10-CM

## 2022-09-12 LAB
ALBUMIN SERPL-MCNC: 3.7 G/DL (ref 3.4–5)
ALBUMIN/GLOB SERPL: 1.1 {RATIO} (ref 1–2)
ALP LIVER SERPL-CCNC: 42 U/L
ALT SERPL-CCNC: 19 U/L
ANION GAP SERPL CALC-SCNC: 2 MMOL/L (ref 0–18)
AST SERPL-CCNC: 23 U/L (ref 15–37)
BASOPHILS # BLD AUTO: 0.1 X10(3) UL (ref 0–0.2)
BASOPHILS NFR BLD AUTO: 2.2 %
BILIRUB SERPL-MCNC: 0.5 MG/DL (ref 0.1–2)
BUN BLD-MCNC: 16 MG/DL (ref 7–18)
CALCIUM BLD-MCNC: 8.9 MG/DL (ref 8.5–10.1)
CHLORIDE SERPL-SCNC: 110 MMOL/L (ref 98–112)
CHOLEST SERPL-MCNC: 195 MG/DL (ref ?–200)
CO2 SERPL-SCNC: 26 MMOL/L (ref 21–32)
CREAT BLD-MCNC: 1.03 MG/DL
EOSINOPHIL # BLD AUTO: 0.13 X10(3) UL (ref 0–0.7)
EOSINOPHIL NFR BLD AUTO: 2.8 %
ERYTHROCYTE [DISTWIDTH] IN BLOOD BY AUTOMATED COUNT: 13.2 %
FASTING PATIENT LIPID ANSWER: YES
FASTING STATUS PATIENT QL REPORTED: YES
GFR SERPLBLD BASED ON 1.73 SQ M-ARVRAT: 67 ML/MIN/1.73M2 (ref 60–?)
GLOBULIN PLAS-MCNC: 3.3 G/DL (ref 2.8–4.4)
GLUCOSE BLD-MCNC: 82 MG/DL (ref 70–99)
HCT VFR BLD AUTO: 41.2 %
HDLC SERPL-MCNC: 57 MG/DL (ref 40–59)
HGB BLD-MCNC: 13.2 G/DL
IMM GRANULOCYTES # BLD AUTO: 0.01 X10(3) UL (ref 0–1)
IMM GRANULOCYTES NFR BLD: 0.2 %
LDLC SERPL CALC-MCNC: 127 MG/DL (ref ?–100)
LYMPHOCYTES # BLD AUTO: 1.6 X10(3) UL (ref 1–4)
LYMPHOCYTES NFR BLD AUTO: 34.7 %
MCH RBC QN AUTO: 30.9 PG (ref 26–34)
MCHC RBC AUTO-ENTMCNC: 32 G/DL (ref 31–37)
MCV RBC AUTO: 96.5 FL
MONOCYTES # BLD AUTO: 0.65 X10(3) UL (ref 0.1–1)
MONOCYTES NFR BLD AUTO: 14.1 %
NEUTROPHILS # BLD AUTO: 2.12 X10 (3) UL (ref 1.5–7.7)
NEUTROPHILS # BLD AUTO: 2.12 X10(3) UL (ref 1.5–7.7)
NEUTROPHILS NFR BLD AUTO: 46 %
NONHDLC SERPL-MCNC: 138 MG/DL (ref ?–130)
OSMOLALITY SERPL CALC.SUM OF ELEC: 286 MOSM/KG (ref 275–295)
PLATELET # BLD AUTO: 289 10(3)UL (ref 150–450)
POTASSIUM SERPL-SCNC: 4.1 MMOL/L (ref 3.5–5.1)
PROT SERPL-MCNC: 7 G/DL (ref 6.4–8.2)
RBC # BLD AUTO: 4.27 X10(6)UL
SODIUM SERPL-SCNC: 138 MMOL/L (ref 136–145)
T4 FREE SERPL-MCNC: 1.2 NG/DL (ref 0.8–1.7)
TRIGL SERPL-MCNC: 62 MG/DL (ref 30–149)
TSI SER-ACNC: 1.21 MIU/ML (ref 0.36–3.74)
VIT B12 SERPL-MCNC: 880 PG/ML (ref 193–986)
VIT D+METAB SERPL-MCNC: 87.2 NG/ML (ref 30–100)
VLDLC SERPL CALC-MCNC: 11 MG/DL (ref 0–30)
WBC # BLD AUTO: 4.6 X10(3) UL (ref 4–11)

## 2022-09-12 PROCEDURE — 80050 GENERAL HEALTH PANEL: CPT | Performed by: FAMILY MEDICINE

## 2022-09-12 PROCEDURE — 80061 LIPID PANEL: CPT | Performed by: FAMILY MEDICINE

## 2022-09-12 PROCEDURE — 82607 VITAMIN B-12: CPT | Performed by: FAMILY MEDICINE

## 2022-09-12 PROCEDURE — 82306 VITAMIN D 25 HYDROXY: CPT | Performed by: FAMILY MEDICINE

## 2022-09-12 PROCEDURE — 84439 ASSAY OF FREE THYROXINE: CPT | Performed by: FAMILY MEDICINE

## 2022-10-12 ENCOUNTER — E-VISIT (OUTPATIENT)
Dept: TELEHEALTH | Age: 47
End: 2022-10-12

## 2022-10-12 DIAGNOSIS — R39.9 UTI SYMPTOMS: Primary | ICD-10-CM

## 2022-10-12 PROCEDURE — 99421 OL DIG E/M SVC 5-10 MIN: CPT | Performed by: NURSE PRACTITIONER

## 2022-10-12 RX ORDER — NITROFURANTOIN 25; 75 MG/1; MG/1
100 CAPSULE ORAL 2 TIMES DAILY
Qty: 10 CAPSULE | Refills: 0 | Status: SHIPPED | OUTPATIENT
Start: 2022-10-12 | End: 2022-10-17

## 2022-10-15 ENCOUNTER — APPOINTMENT (OUTPATIENT)
Dept: CT IMAGING | Age: 47
End: 2022-10-15
Attending: EMERGENCY MEDICINE
Payer: COMMERCIAL

## 2022-10-15 ENCOUNTER — HOSPITAL ENCOUNTER (OUTPATIENT)
Age: 47
Discharge: HOME | End: 2022-10-15
Attending: EMERGENCY MEDICINE
Payer: COMMERCIAL

## 2022-10-15 VITALS
BODY MASS INDEX: 24.8 KG/M2 | HEIGHT: 63 IN | TEMPERATURE: 98 F | RESPIRATION RATE: 16 BRPM | DIASTOLIC BLOOD PRESSURE: 80 MMHG | OXYGEN SATURATION: 98 % | SYSTOLIC BLOOD PRESSURE: 132 MMHG | HEART RATE: 76 BPM | WEIGHT: 140 LBS

## 2022-10-15 DIAGNOSIS — M54.50 ACUTE LEFT-SIDED LOW BACK PAIN WITHOUT SCIATICA: Primary | ICD-10-CM

## 2022-10-15 LAB
B-HCG UR QL: NEGATIVE
POCT BILIRUBIN URINE: NEGATIVE
POCT GLUCOSE URINE: NEGATIVE MG/DL
POCT KETONE URINE: NEGATIVE MG/DL
POCT LEUKOCYTE ESTERASE URINE: NEGATIVE
POCT NITRITE URINE: NEGATIVE
POCT PH URINE: 5.5 (ref 5–8)
POCT PROTEIN URINE: NEGATIVE MG/DL
POCT SPECIFIC GRAVITY URINE: 1
POCT URINE CLARITY: CLEAR
POCT URINE COLOR: YELLOW
POCT UROBILINOGEN URINE: 0.2 MG/DL

## 2022-10-15 PROCEDURE — 99214 OFFICE O/P EST MOD 30 MIN: CPT

## 2022-10-15 PROCEDURE — 74176 CT ABD & PELVIS W/O CONTRAST: CPT | Performed by: EMERGENCY MEDICINE

## 2022-10-15 PROCEDURE — 99204 OFFICE O/P NEW MOD 45 MIN: CPT

## 2022-10-15 PROCEDURE — 81002 URINALYSIS NONAUTO W/O SCOPE: CPT | Performed by: EMERGENCY MEDICINE

## 2022-10-15 PROCEDURE — 81025 URINE PREGNANCY TEST: CPT

## 2022-10-15 NOTE — ED INITIAL ASSESSMENT (HPI)
Pt presents today with c/o dysuria and left low back pain. Pt states that she began having UTI symptoms- urinary frequency, hematuria, and dysuria on Tuesday night. Pt did a video visit with her PCP who prescribed her Macrobid. Pt has also been using AZO. Pt denies any fevers. Pt states that yesterday she began having left lower back pain, headache, and body aches. Pt states that she does have a hx of low back pain so she is not sure if it is just her usual back pain.

## 2022-12-12 ENCOUNTER — OFFICE VISIT (OUTPATIENT)
Dept: FAMILY MEDICINE CLINIC | Facility: CLINIC | Age: 47
End: 2022-12-12
Payer: COMMERCIAL

## 2022-12-12 VITALS
HEIGHT: 63 IN | HEART RATE: 98 BPM | SYSTOLIC BLOOD PRESSURE: 140 MMHG | DIASTOLIC BLOOD PRESSURE: 78 MMHG | WEIGHT: 141 LBS | BODY MASS INDEX: 24.98 KG/M2 | OXYGEN SATURATION: 98 % | RESPIRATION RATE: 20 BRPM

## 2022-12-12 DIAGNOSIS — R21 RASH OF GENITAL AREA: Primary | ICD-10-CM

## 2022-12-12 PROCEDURE — 3077F SYST BP >= 140 MM HG: CPT | Performed by: INTERNAL MEDICINE

## 2022-12-12 PROCEDURE — 87077 CULTURE AEROBIC IDENTIFY: CPT | Performed by: INTERNAL MEDICINE

## 2022-12-12 PROCEDURE — 3008F BODY MASS INDEX DOCD: CPT | Performed by: INTERNAL MEDICINE

## 2022-12-12 PROCEDURE — 87529 HSV DNA AMP PROBE: CPT | Performed by: INTERNAL MEDICINE

## 2022-12-12 PROCEDURE — 99213 OFFICE O/P EST LOW 20 MIN: CPT | Performed by: INTERNAL MEDICINE

## 2022-12-12 PROCEDURE — 87070 CULTURE OTHR SPECIMN AEROBIC: CPT | Performed by: INTERNAL MEDICINE

## 2022-12-12 PROCEDURE — 87205 SMEAR GRAM STAIN: CPT | Performed by: INTERNAL MEDICINE

## 2022-12-12 PROCEDURE — 3078F DIAST BP <80 MM HG: CPT | Performed by: INTERNAL MEDICINE

## 2022-12-13 LAB
HSV1 DNA SPEC QL NAA+PROBE: NEGATIVE
HSV2 DNA SPEC QL NAA+PROBE: NEGATIVE

## 2022-12-21 ENCOUNTER — OFFICE VISIT (OUTPATIENT)
Facility: CLINIC | Age: 47
End: 2022-12-21
Payer: COMMERCIAL

## 2022-12-21 VITALS
SYSTOLIC BLOOD PRESSURE: 120 MMHG | DIASTOLIC BLOOD PRESSURE: 68 MMHG | HEIGHT: 63 IN | HEART RATE: 84 BPM | WEIGHT: 139 LBS | BODY MASS INDEX: 24.63 KG/M2

## 2022-12-21 DIAGNOSIS — L73.9 FOLLICULITIS: Primary | ICD-10-CM

## 2022-12-21 PROCEDURE — 3078F DIAST BP <80 MM HG: CPT | Performed by: OBSTETRICS & GYNECOLOGY

## 2022-12-21 PROCEDURE — 99213 OFFICE O/P EST LOW 20 MIN: CPT | Performed by: OBSTETRICS & GYNECOLOGY

## 2022-12-21 PROCEDURE — 3008F BODY MASS INDEX DOCD: CPT | Performed by: OBSTETRICS & GYNECOLOGY

## 2022-12-21 PROCEDURE — 3074F SYST BP LT 130 MM HG: CPT | Performed by: OBSTETRICS & GYNECOLOGY

## 2023-04-03 ENCOUNTER — OFFICE VISIT (OUTPATIENT)
Dept: PODIATRY CLINIC | Facility: CLINIC | Age: 48
End: 2023-04-03

## 2023-04-03 DIAGNOSIS — M20.5X1 HALLUX LIMITUS, RIGHT: ICD-10-CM

## 2023-04-03 DIAGNOSIS — M25.572 PAIN IN JOINT OF LEFT FOOT: ICD-10-CM

## 2023-04-03 DIAGNOSIS — M79.671 RIGHT FOOT PAIN: ICD-10-CM

## 2023-04-03 DIAGNOSIS — M19.072 ARTHROSIS OF MIDFOOT, LEFT: ICD-10-CM

## 2023-04-03 DIAGNOSIS — M19.071 ARTHROSIS OF MIDFOOT, RIGHT: ICD-10-CM

## 2023-04-03 DIAGNOSIS — M20.5X2 HALLUX LIMITUS, LEFT: ICD-10-CM

## 2023-04-03 DIAGNOSIS — M25.571 PAIN IN JOINT OF RIGHT FOOT: ICD-10-CM

## 2023-04-03 DIAGNOSIS — M79.672 LEFT FOOT PAIN: Primary | ICD-10-CM

## 2023-04-03 PROCEDURE — 99213 OFFICE O/P EST LOW 20 MIN: CPT | Performed by: PODIATRIST

## 2023-10-16 ENCOUNTER — OFFICE VISIT (OUTPATIENT)
Dept: FAMILY MEDICINE CLINIC | Facility: CLINIC | Age: 48
End: 2023-10-16
Payer: COMMERCIAL

## 2023-10-16 VITALS
DIASTOLIC BLOOD PRESSURE: 50 MMHG | OXYGEN SATURATION: 95 % | RESPIRATION RATE: 16 BRPM | HEIGHT: 63.25 IN | SYSTOLIC BLOOD PRESSURE: 110 MMHG | WEIGHT: 149 LBS | BODY MASS INDEX: 26.08 KG/M2 | HEART RATE: 70 BPM

## 2023-10-16 DIAGNOSIS — Z01.419 WELL WOMAN EXAM WITH ROUTINE GYNECOLOGICAL EXAM: Primary | ICD-10-CM

## 2023-10-16 DIAGNOSIS — Z00.00 ANNUAL PHYSICAL EXAM: ICD-10-CM

## 2023-10-16 DIAGNOSIS — F41.9 ANXIETY: ICD-10-CM

## 2023-10-16 DIAGNOSIS — Z12.11 COLON CANCER SCREENING: ICD-10-CM

## 2023-10-16 DIAGNOSIS — M54.50 LUMBAR PAIN: ICD-10-CM

## 2023-10-16 DIAGNOSIS — Z12.31 ENCOUNTER FOR SCREENING MAMMOGRAM FOR HIGH-RISK PATIENT: ICD-10-CM

## 2023-10-16 RX ORDER — PROPRANOLOL HYDROCHLORIDE 10 MG/1
10 TABLET ORAL 3 TIMES DAILY PRN
Qty: 90 TABLET | Refills: 0 | Status: SHIPPED | OUTPATIENT
Start: 2023-10-16

## 2023-10-17 LAB — HPV MRNA E6/E7: NOT DETECTED

## 2023-10-18 ENCOUNTER — PATIENT MESSAGE (OUTPATIENT)
Dept: FAMILY MEDICINE CLINIC | Facility: CLINIC | Age: 48
End: 2023-10-18

## 2023-10-18 ENCOUNTER — HOSPITAL ENCOUNTER (OUTPATIENT)
Dept: GENERAL RADIOLOGY | Age: 48
Discharge: HOME | End: 2023-10-18
Attending: FAMILY MEDICINE
Payer: COMMERCIAL

## 2023-10-18 DIAGNOSIS — M54.50 LUMBAR PAIN: ICD-10-CM

## 2023-10-18 PROCEDURE — 72110 X-RAY EXAM L-2 SPINE 4/>VWS: CPT | Performed by: FAMILY MEDICINE

## 2023-10-18 NOTE — TELEPHONE ENCOUNTER
From: Anahy Sales  To: Shawna De La Paz  Sent: 10/18/2023 1:55 PM CDT  Subject: Lab results    I had a physical with Dr Dionicio Medina Monday and she asked if I could submit these [facility] lab results to be included in my records. I was not sure the process to do this. Thanks!   Roxanna

## 2023-10-20 DIAGNOSIS — M51.36 DDD (DEGENERATIVE DISC DISEASE), LUMBAR: Primary | ICD-10-CM

## 2023-10-21 ENCOUNTER — HOSPITAL ENCOUNTER (OUTPATIENT)
Dept: MAMMOGRAPHY | Age: 48
Discharge: HOME | End: 2023-10-21
Attending: FAMILY MEDICINE

## 2023-10-21 DIAGNOSIS — Z12.31 ENCOUNTER FOR SCREENING MAMMOGRAM FOR HIGH-RISK PATIENT: ICD-10-CM

## 2023-10-21 PROCEDURE — 77067 SCR MAMMO BI INCL CAD: CPT | Performed by: FAMILY MEDICINE

## 2023-10-21 PROCEDURE — 77063 BREAST TOMOSYNTHESIS BI: CPT | Performed by: FAMILY MEDICINE

## 2023-10-27 ENCOUNTER — HOSPITAL ENCOUNTER (OUTPATIENT)
Age: 48
Discharge: HOME | End: 2023-10-27
Payer: COMMERCIAL

## 2023-10-27 ENCOUNTER — APPOINTMENT (OUTPATIENT)
Dept: GENERAL RADIOLOGY | Age: 48
End: 2023-10-27
Attending: PHYSICIAN ASSISTANT
Payer: COMMERCIAL

## 2023-10-27 VITALS
TEMPERATURE: 98 F | HEIGHT: 63 IN | DIASTOLIC BLOOD PRESSURE: 64 MMHG | SYSTOLIC BLOOD PRESSURE: 168 MMHG | HEART RATE: 50 BPM | OXYGEN SATURATION: 99 % | WEIGHT: 149 LBS | RESPIRATION RATE: 18 BRPM | BODY MASS INDEX: 26.4 KG/M2

## 2023-10-27 DIAGNOSIS — S93.401A MODERATE RIGHT ANKLE SPRAIN, INITIAL ENCOUNTER: ICD-10-CM

## 2023-10-27 DIAGNOSIS — M17.12 TRICOMPARTMENT OSTEOARTHRITIS OF LEFT KNEE: Primary | ICD-10-CM

## 2023-10-27 PROCEDURE — 99213 OFFICE O/P EST LOW 20 MIN: CPT

## 2023-10-27 PROCEDURE — 99214 OFFICE O/P EST MOD 30 MIN: CPT

## 2023-10-27 PROCEDURE — 73562 X-RAY EXAM OF KNEE 3: CPT | Performed by: PHYSICIAN ASSISTANT

## 2023-10-27 PROCEDURE — 73610 X-RAY EXAM OF ANKLE: CPT | Performed by: PHYSICIAN ASSISTANT

## 2023-10-27 RX ORDER — IBUPROFEN 600 MG/1
600 TABLET ORAL EVERY 8 HOURS PRN
Qty: 30 TABLET | Refills: 0 | Status: SHIPPED | OUTPATIENT
Start: 2023-10-27 | End: 2023-11-03

## 2023-10-28 NOTE — DISCHARGE INSTRUCTIONS
Ibuprofen 400mg 3 times a day with food for 3 to 5 days, may alternate with Tylenol 500mg  Light activity, warm cool compresses topically for discomfort  Return if worse      The best treatment for minor injuries is R.I.C.E. and NSAID medications. R.I.C.E. = Rest  Ice  Compression  Elevation      Rest: Do not use the injured body part unnecessarily. If this is a lower extremity, do not take long walks, run or do anything that causes increased pain. Gradually progress to your normal activity, using pain as your guide. Ice: Apply cold compresses to the injured area. The area that is injured is inflammed. Inflammation causes warmth, so ice may give some relief. You may ice through a brace or ace wrap. Compression: Compression to the area will help control swelling. An ace wrap is the most simple form of compression. You can also wear a brace. Elevation: Raise the injured extremity above heart level. This will reduce throbbing pain and swelling associated with injures. Prop the injured extremity up with pillows while lying down.

## 2023-11-08 ENCOUNTER — HOSPITAL ENCOUNTER (OUTPATIENT)
Dept: ULTRASOUND IMAGING | Age: 48
Discharge: HOME | End: 2023-11-08
Attending: FAMILY MEDICINE
Payer: COMMERCIAL

## 2023-11-08 ENCOUNTER — HOSPITAL ENCOUNTER (OUTPATIENT)
Dept: MAMMOGRAPHY | Age: 48
Discharge: HOME | End: 2023-11-08
Attending: FAMILY MEDICINE
Payer: COMMERCIAL

## 2023-11-08 DIAGNOSIS — R92.2 INCONCLUSIVE MAMMOGRAM: ICD-10-CM

## 2023-11-08 PROCEDURE — 77065 DX MAMMO INCL CAD UNI: CPT | Performed by: FAMILY MEDICINE

## 2023-11-08 PROCEDURE — 77061 BREAST TOMOSYNTHESIS UNI: CPT | Performed by: FAMILY MEDICINE

## 2023-11-08 PROCEDURE — 76642 ULTRASOUND BREAST LIMITED: CPT | Performed by: FAMILY MEDICINE

## 2023-11-08 NOTE — IMAGING NOTE
Marion Faye is recommended for a stereotactic/tomosynthesis guided biopsy of the right breast by Lance Sims. History Inconclusive mammogram   Findings-Indeterminate calcifications at the 1100 hours periareolar position of the RIGHT breast   There is small subcentimeter mass on ultrasound at the 1000 hours retroareolar position of the RIGHT breast that has imaging characteristics that are likely benign. Pending benign biopsy results, a follow-up targeted right breast ultrasound in 6 months   would be recommended. Recommendation-STEREOTACTIC BIOPSY WITH 3D/RUBINA RIGHT BREAST     See EMR for complete imaging report    Medications and allergies reviewed:  Current Outpatient Medications   Medication Sig Dispense Refill    propranolol 10 MG Oral Tab Take 1 tablet (10 mg total) by mouth 3 (three) times daily as needed. 90 tablet 0    Multiple Vitamins-Minerals (MULTIVITAMIN OR) Take by mouth. CALCIUM OR Take by mouth. Omega-3 Fatty Acids (FISH OIL OR) Take by mouth. Cyanocobalamin (VITAMIN B 12 OR) Take by mouth. The following allergy was reported-  AmoxicillinRASH    Marion Faye denies the use of prescribed anticoagulants, denies known bleeding disorders and/or liver disease, denies chemotherapy    Procedure explained and questions answered. Marion Faye provided with written educational material.     Ms. Sabine Melton instructed to take 1000 mg of acetaminophen on the day of the biopsy, eat a light meal, and bring or wear a sport bra. Post biopsy care and instruction reviewed: including no lifting more than five pounds, no upper body exercise, icing of biopsy site, no submerging in water. Marion Faye verbalized understanding. Dr. Mynor Ramirez provided super order. Ms. Sabine Melton informed that the [address] [place] [address] would be contacting her to schedule an appointment.

## 2023-11-13 ENCOUNTER — HOSPITAL ENCOUNTER (OUTPATIENT)
Dept: MRI IMAGING | Age: 48
Discharge: HOME | End: 2023-11-13
Attending: FAMILY MEDICINE
Payer: COMMERCIAL

## 2023-11-13 DIAGNOSIS — M51.36 DDD (DEGENERATIVE DISC DISEASE), LUMBAR: ICD-10-CM

## 2023-11-13 PROCEDURE — 72148 MRI LUMBAR SPINE W/O DYE: CPT | Performed by: FAMILY MEDICINE

## 2023-11-15 ENCOUNTER — HOSPITAL ENCOUNTER (OUTPATIENT)
Dept: MAMMOGRAPHY | Facility: HOSPITAL | Age: 48
Discharge: HOME | End: 2023-11-15
Attending: FAMILY MEDICINE
Payer: COMMERCIAL

## 2023-11-15 DIAGNOSIS — R92.1 BREAST CALCIFICATIONS: ICD-10-CM

## 2023-11-15 PROCEDURE — 88341 IMHCHEM/IMCYTCHM EA ADD ANTB: CPT | Performed by: FAMILY MEDICINE

## 2023-11-15 PROCEDURE — 88305 TISSUE EXAM BY PATHOLOGIST: CPT | Performed by: FAMILY MEDICINE

## 2023-11-15 PROCEDURE — 88342 IMHCHEM/IMCYTCHM 1ST ANTB: CPT | Performed by: FAMILY MEDICINE

## 2023-11-15 PROCEDURE — 19081 BX BREAST 1ST LESION STRTCTC: CPT | Performed by: FAMILY MEDICINE

## 2023-11-17 ENCOUNTER — TELEPHONE (OUTPATIENT)
Dept: FAMILY MEDICINE CLINIC | Facility: CLINIC | Age: 48
End: 2023-11-17

## 2023-11-17 NOTE — IMAGING NOTE
1412: Spoke to VIMAL Portillo in Dr. Edith Kraft office. Discussed pathology results for Roxanna Gilliland's right breast biopsy performed Wednesday, November 15 as follows:   Final Diagnosis:   Right breast calcifications 11:00, stereotactic 9 gauge needle core biopsies:  -Foci of atypical lobular hyperplasia (Chantale Hole). See EMR for complete pathology report      Dr. Ella Obrien referring to Dr. Marlen Dueñas or Dr. Fernie Turner. Informed VIMAL Portillo that this breast nurse would provide pathology results as above to Nicole Call and instruct Ms. Rola Jackson to make an appointment with either Dr. Fernie Turner or Dr. Marlen Dueñas. VIMAL Portillo to report above pathology to Dr. Ella Obrien.

## 2023-11-17 NOTE — IMAGING NOTE
1422: Spoke with Nicole Call post stereotactic right breast biopsy. Introduced myself as breast care coordinator and informed Nicole Oneyda  of the purpose of my call. Name and date of birth verified by patient. Reinforced post biopsy care and instruction. Ms. Rola Jackson denies any issues with biopsy site- bleeding, drainage, redness, tenderness. Nicolekarsten Russa confirmed that she was aware of pathology results as below-MyChart notification. Pathology results and recommendations discussed as follows:   Final Diagnosis:   Right breast calcifications 11:00, stereotactic 9 gauge needle core biopsies:  -Foci of atypical lobular hyperplasia (ALH). See EMR for complete pathology report      Dr. Ella Obrien referring to Dr. Marlen Dueñas or Dr. Fernie Turner. Instructed Nicolekarsten Russa to make an appointment with Dr. Marlen Dueñas or Dr. Fernie Turner. Ms. Rola Jackson provided with contact information for above surgeons. Encouraged Ms. Rola Jackson to follow-up with Dr. Ella Obrien if she has questions or concerns prior to her appointment with a surgeon. Nicole Oneyda verbalized understanding and agreement to the above.

## 2023-11-17 NOTE — TELEPHONE ENCOUNTER
Dr. Leyva,  See path report for breast bx. Cheryl from Radiology will call patient with results and refer her to Nima and give her info to call to make appt.

## 2023-11-28 ENCOUNTER — OFFICE VISIT (OUTPATIENT)
Dept: FAMILY MEDICINE CLINIC | Facility: CLINIC | Age: 48
End: 2023-11-28
Payer: COMMERCIAL

## 2023-11-28 VITALS
RESPIRATION RATE: 16 BRPM | BODY MASS INDEX: 26.58 KG/M2 | HEART RATE: 54 BPM | HEIGHT: 63 IN | SYSTOLIC BLOOD PRESSURE: 110 MMHG | WEIGHT: 150 LBS | DIASTOLIC BLOOD PRESSURE: 80 MMHG | OXYGEN SATURATION: 98 %

## 2023-11-28 DIAGNOSIS — M47.816 FACET ARTHRITIS OF LUMBAR REGION: ICD-10-CM

## 2023-11-28 DIAGNOSIS — N60.91 ATYPICAL LOBULAR HYPERPLASIA (ALH) OF RIGHT BREAST: Primary | ICD-10-CM

## 2023-11-28 DIAGNOSIS — F51.02 ADJUSTMENT INSOMNIA: ICD-10-CM

## 2023-11-28 DIAGNOSIS — M51.36 DDD (DEGENERATIVE DISC DISEASE), LUMBAR: ICD-10-CM

## 2023-11-28 DIAGNOSIS — M51.36 BULGING LUMBAR DISC: ICD-10-CM

## 2023-11-28 PROCEDURE — 99214 OFFICE O/P EST MOD 30 MIN: CPT | Performed by: FAMILY MEDICINE

## 2023-11-28 PROCEDURE — 3079F DIAST BP 80-89 MM HG: CPT | Performed by: FAMILY MEDICINE

## 2023-11-28 PROCEDURE — 3074F SYST BP LT 130 MM HG: CPT | Performed by: FAMILY MEDICINE

## 2023-11-28 PROCEDURE — 3008F BODY MASS INDEX DOCD: CPT | Performed by: FAMILY MEDICINE

## 2023-11-30 PROBLEM — N60.91 ATYPICAL LOBULAR HYPERPLASIA (ALH) OF RIGHT BREAST: Status: ACTIVE | Noted: 2023-11-30

## 2023-12-15 ENCOUNTER — TELEPHONE (OUTPATIENT)
Dept: PHYSICAL THERAPY | Facility: HOSPITAL | Age: 48
End: 2023-12-15

## 2023-12-19 ENCOUNTER — OFFICE VISIT (OUTPATIENT)
Dept: PHYSICAL THERAPY | Age: 48
End: 2023-12-19
Attending: FAMILY MEDICINE
Payer: COMMERCIAL

## 2023-12-19 DIAGNOSIS — M47.816 FACET ARTHRITIS OF LUMBAR REGION: ICD-10-CM

## 2023-12-19 DIAGNOSIS — M51.36 BULGING LUMBAR DISC: ICD-10-CM

## 2023-12-19 DIAGNOSIS — M51.36 DDD (DEGENERATIVE DISC DISEASE), LUMBAR: Primary | ICD-10-CM

## 2023-12-19 PROCEDURE — 97110 THERAPEUTIC EXERCISES: CPT

## 2023-12-19 PROCEDURE — 97161 PT EVAL LOW COMPLEX 20 MIN: CPT

## 2023-12-21 ENCOUNTER — OFFICE VISIT (OUTPATIENT)
Dept: PHYSICAL THERAPY | Age: 48
End: 2023-12-21
Attending: FAMILY MEDICINE
Payer: COMMERCIAL

## 2023-12-21 PROCEDURE — 97110 THERAPEUTIC EXERCISES: CPT

## 2023-12-21 NOTE — PROGRESS NOTES
Diagnosis:   DDD (degenerative disc disease), lumbar (M51.36)  Facet arthritis of lumbar region (M47.816)  Bulging lumbar disc (M51.36)   Insurance (Authorized # of Visits):  Yazmin Martin Physician: Dr. Dionicio Page MD visit: none scheduled  Fall Risk: standard         Precautions: n/a             Subjective: Patient states that she has an increase in her back pain. Only did exercises 1-2 times a day. Pain rating 6/10 across low back    Objective:   12/21/23 pain level in standing 3/10   Lumbar flexion no loss; extension mod loss    Date: 12/21/23   TX#: 2   TherEx:  Prone on elbows x 2 minutes (feels an ache central low back)  Repeated extension in lying with self overpressure  x 10  Repeated extension lying with therapist overpressure x 10  Extension mobilization L4-S1 x 2 minute  Sustained lumbar extension in prone on elbows x 3 minutes   Prone laying x 2 minutes  Supine bridges x 20   HEP:  prone laying followed by prone on elbows x 2 minutes each; if better in 2 days than repeated extension in lying or standing x 10 (every 3 hours)     Assessment: Patient does demonstrates improved lumbar extension range of motion after extension based exercises. Patient to decrease extension forces (start in prone laying and prone on elbows) and then progress over the next few days (repeated extension in lying). Goals: (to be met in 8 visits)   Pt will demonstrate good understanding of proper posture and body mechanics to decrease pain and improve spinal safety   Pt will be able to sit for any length of time without back pain.    Pt will be independent and compliant with comprehensive HEP to maintain progress achieved in PT     Plan: Assess response to extension based exercise    Charges: 3TE       Total Timed Treatment: 45 min  Total Treatment Time: 45 min

## 2023-12-28 ENCOUNTER — OFFICE VISIT (OUTPATIENT)
Dept: PHYSICAL THERAPY | Age: 48
End: 2023-12-28
Attending: FAMILY MEDICINE
Payer: COMMERCIAL

## 2023-12-28 PROCEDURE — 97110 THERAPEUTIC EXERCISES: CPT

## 2023-12-28 NOTE — PROGRESS NOTES
Diagnosis:   DDD (degenerative disc disease), lumbar (M51.36)  Facet arthritis of lumbar region (M47.816)  Bulging lumbar disc (M51.36)   Insurance (Authorized # of Visits):  Kari Nurse Physician: Dr. Alesia BURCIAGA visit: none scheduled  Fall Risk: standard         Precautions: n/a             Subjective: Patient states that she had an increase in pain but feels it is because she has really busy and on her feet a lot. Currently feels low back pain 3/10. Objective:   12/21/23 pain level in standing 3/10   Lumbar flexion no loss; extension mod loss  12/28/23 Repeated flexion in standing x 10 Initially felt pain but then pain decreased; does feel pain at end range  Repeated extension in lying x 10 centered low back pain    Date: 12/21/23   TX#: 2 Date 12/28/23   Tx 3   TherEx:  Prone on elbows x 2 minutes (feels an ache central low back)  Repeated extension in lying with self overpressure  x 10  Repeated extension lying with therapist overpressure x 10  Extension mobilization L4-S1 x 2 minute  Sustained lumbar extension in prone on elbows x 3 minutes   Prone laying x 2 minutes  Supine bridges x 20 TherEx:  Reassessment of repeated movement exam and discussion of symptoms x 10 minutes   Repeated extension in lying with self overpressure  x 10  Repeated extension lying with therapist overpressure x 10  Extension mobilization L4-S1 x 2 minute  Repeated extension followed by repeated flexion followed again with repeated extension x 10   HEP:  repeated extension followed by repeated flexion followed by repeated extension (4-5 times a day). Assessment: After repeated extension with overpressure patient had increased stiffness into flexion. Patient to trial repeated extension followed by repeated flexion followed again with repeated extension to increased lumbar mobility in both directions.       Goals: (to be met in 8 visits)   Pt will demonstrate good understanding of proper posture and body mechanics to decrease pain and improve spinal safety   Pt will be able to sit for any length of time without back pain.    Pt will be independent and compliant with comprehensive HEP to maintain progress achieved in PT     Plan: Assess response to extension based exercise    Charges: 3TE       Total Timed Treatment: 45 min  Total Treatment Time: 45 min

## 2024-01-02 ENCOUNTER — OFFICE VISIT (OUTPATIENT)
Dept: PHYSICAL THERAPY | Age: 49
End: 2024-01-02
Attending: FAMILY MEDICINE
Payer: COMMERCIAL

## 2024-01-02 PROCEDURE — 97110 THERAPEUTIC EXERCISES: CPT

## 2024-01-03 ENCOUNTER — TELEPHONE (OUTPATIENT)
Dept: PHYSICAL THERAPY | Facility: HOSPITAL | Age: 49
End: 2024-01-03

## 2024-01-04 ENCOUNTER — OFFICE VISIT (OUTPATIENT)
Dept: PHYSICAL THERAPY | Age: 49
End: 2024-01-04
Attending: FAMILY MEDICINE
Payer: COMMERCIAL

## 2024-01-04 PROCEDURE — 97110 THERAPEUTIC EXERCISES: CPT

## 2024-01-05 NOTE — PROGRESS NOTES
Diagnosis:   DDD (degenerative disc disease), lumbar (M51.36)  Facet arthritis of lumbar region (M47.816)  Bulging lumbar disc (M51.36)   Insurance (Authorized # of Visits):  [facility] PPO          Authorizing Physician: Dr. Gordon Page MD visit: none scheduled  Fall Risk: standard         Precautions: n/a             Subjective: Patient states that her pain is improving and rates pain 4/10 across the low back and more centered today.     Objective:   12/21/23 pain level in standing 3/10   Lumbar flexion no loss; extension mod loss  12/28/23 Repeated flexion in standing x 10 Initially felt pain but then pain decreased; does feel pain at end range  Repeated extension in lying x 10 centered low back pain    Date: 12/21/23   TX#: 2 Date 12/28/23   Tx 3 Date 01/02/24   Tx 4 Date 01/04/24   Tx 5   TherEx:  Prone on elbows x 2 minutes (feels an ache central low back)  Repeated extension in lying with self overpressure  x 10  Repeated extension lying with therapist overpressure x 10  Extension mobilization L4-S1 x 2 minute  Sustained lumbar extension in prone on elbows x 3 minutes   Prone laying x 2 minutes  Supine bridges x 20 TherEx:  Reassessment of repeated movement exam and discussion of symptoms x 10 minutes   Repeated extension in lying with self overpressure  x 10  Repeated extension lying with therapist overpressure x 10  Extension mobilization L4-S1 x 2 minute  Repeated extension followed by repeated flexion followed again with repeated extension x 10 TherEx:  Discussion of symptoms as a result of previously given home exercises x 10 minutes  Repeated knees to chest in supine position 3 x 10  Repeated knees to chest with therapist OP 2 x 10  Lower trunk rotation 2 x 10  TrA isometric 5 second hold x 20  TrA isometric with marches x 20  Sidelying clams x 20 B  Sidelying hip abduction x 20 B TherEx:  NuStep level 4 Bridges 2 x 10  Repeated knees to chest in supine position 2 x 10  Repeated knees to chest with therapist  OP 2 x 10  Lower trunk rotation 2 x 10  Seated flexion with OP 2 x 10  TrA isometric 5 second hold x 20  TrA isometric with marches x 20  Sidelying clams x 10 B   HEP:  Repeated flexion in supine with overpressure x 10; lower rotation rotation; seated flexion with OP x 10    Assessment: patient had good response to repeated flexion and progressed to overpressure in seated.       Goals: (to be met in 8 visits)   Pt will demonstrate good understanding of proper posture and body mechanics to decrease pain and improve spinal safety   Pt will be able to sit for any length of time without back pain.   Pt will be independent and compliant with comprehensive HEP to maintain progress achieved in PT     Plan: Assess response to flexion based exercise    Charges: 3TE       Total Timed Treatment: 45 min  Total Treatment Time: 45 min

## 2024-01-09 ENCOUNTER — OFFICE VISIT (OUTPATIENT)
Dept: PHYSICAL THERAPY | Age: 49
End: 2024-01-09
Attending: FAMILY MEDICINE
Payer: COMMERCIAL

## 2024-01-09 PROCEDURE — 97110 THERAPEUTIC EXERCISES: CPT

## 2024-01-09 NOTE — PROGRESS NOTES
Diagnosis:   DDD (degenerative disc disease), lumbar (M51.36)  Facet arthritis of lumbar region (M47.816)  Bulging lumbar disc (M51.36)   Insurance (Authorized # of Visits):  [facility] PPO          Authorizing Physician: Dr. Gordon Page MD visit: none scheduled  Fall Risk: standard         Precautions: n/a             Subjective: Patient states she is about the same as the last visit. Has not been using the rowing machine and does feel like that is helping. Does feels loosened up after the exercise. Overall, feels the same since starting therapy. Currently feeling no pain at rest and 4/10 with any activity.     Objective:   12/21/23 pain level in standing 3/10   Lumbar flexion no loss; extension mod loss  12/28/23 Repeated flexion in standing x 10 Initially felt pain but then pain decreased; does feel pain at end range  Repeated extension in lying x 10 centered low back pain  01/09/24 R side glide - mod loss, pain on right side  L Side glide- mod loss pain on left low back  Flexion no loss and pain across back  Extension mod loss and pain across the back    Date: 12/21/23   TX#: 2 Date 12/28/23   Tx 3 Date 01/02/24   Tx 4 Date 01/04/24   Tx 5 Date 01/09/24   Tx 6   TherEx:  Prone on elbows x 2 minutes (feels an ache central low back)  Repeated extension in lying with self overpressure  x 10  Repeated extension lying with therapist overpressure x 10  Extension mobilization L4-S1 x 2 minute  Sustained lumbar extension in prone on elbows x 3 minutes   Prone laying x 2 minutes  Supine bridges x 20 TherEx:  Reassessment of repeated movement exam and discussion of symptoms x 10 minutes   Repeated extension in lying with self overpressure  x 10  Repeated extension lying with therapist overpressure x 10  Extension mobilization L4-S1 x 2 minute  Repeated extension followed by repeated flexion followed again with repeated extension x 10 TherEx:  Discussion of symptoms as a result of previously given home exercises x 10  minutes  Repeated knees to chest in supine position 3 x 10  Repeated knees to chest with therapist OP 2 x 10  Lower trunk rotation 2 x 10  TrA isometric 5 second hold x 20  TrA isometric with marches x 20  Sidelying clams x 20 B  Sidelying hip abduction x 20 B TherEx:  NuStep level 4   Bridges 2 x 10  Repeated knees to chest in supine position 2 x 10  Repeated knees to chest with therapist OP 2 x 10  Lower trunk rotation 2 x 10  Seated flexion with OP 2 x 10  TrA isometric 5 second hold x 20  TrA isometric with marches x 20  Sidelying clams x 10 B TherEx:  Lower extremity bike level 7 x 5 minutes   Repeated movement testing:  R side glide (hips left) 2 x 10  L side glide (hips right) 2 x 10   Repeated flexion in standing 2 x 10  Repeated knees to chest in supine position 2 x 10  Lower trunk rotation 2 x 10  TrA isometric with marches x 20  Sidelying clams with red theraband x 20   HEP:  Repeated flexion in standing x 10; lower rotation rotation; seated flexion with OP x 10    Assessment: Patient had no significant change with repeated flexion in in supine and sitting with overpressure. Patient to trial force progression to repeated flexion in standing. Did assess lateral movements today however patient had no change in baseline pain.       Goals: (to be met in 8 visits)   Pt will demonstrate good understanding of proper posture and body mechanics to decrease pain and improve spinal safety   Pt will be able to sit for any length of time without back pain.   Pt will be independent and compliant with comprehensive HEP to maintain progress achieved in PT     Plan: Assess response to flexion based exercise    Charges: 3TE       Total Timed Treatment: 45 min  Total Treatment Time: 45 min

## 2024-01-11 ENCOUNTER — OFFICE VISIT (OUTPATIENT)
Dept: PHYSICAL THERAPY | Age: 49
End: 2024-01-11
Attending: FAMILY MEDICINE
Payer: COMMERCIAL

## 2024-01-11 PROCEDURE — 97110 THERAPEUTIC EXERCISES: CPT

## 2024-01-11 NOTE — PROGRESS NOTES
Diagnosis:   DDD (degenerative disc disease), lumbar (M51.36)  Facet arthritis of lumbar region (M47.816)  Bulging lumbar disc (M51.36)   Insurance (Authorized # of Visits):  [facility] PPO          Authorizing Physician: Dr. Gordon Page MD visit: none scheduled  Fall Risk: standard         Precautions: n/a             Subjective: Patient states that she feels about the same if not more stiff.     Objective:   12/21/23 pain level in standing 3/10   Lumbar flexion no loss; extension mod loss  12/28/23 Repeated flexion in standing x 10 Initially felt pain but then pain decreased; does feel pain at end range  Repeated extension in lying x 10 centered low back pain  01/09/24 R side glide - mod loss, pain on right side  L Side glide- mod loss pain on left low back  Flexion no loss and pain across back  Extension mod loss and pain across the back    Date: 12/21/23   TX#: 2 Date 12/28/23   Tx 3 Date 01/02/24   Tx 4 Date 01/04/24   Tx 5 Date 01/09/24   Tx 6 Date 01/11/24   Tx 7   TherEx:  Prone on elbows x 2 minutes (feels an ache central low back)  Repeated extension in lying with self overpressure  x 10  Repeated extension lying with therapist overpressure x 10  Extension mobilization L4-S1 x 2 minute  Sustained lumbar extension in prone on elbows x 3 minutes   Prone laying x 2 minutes  Supine bridges x 20 TherEx:  Reassessment of repeated movement exam and discussion of symptoms x 10 minutes   Repeated extension in lying with self overpressure  x 10  Repeated extension lying with therapist overpressure x 10  Extension mobilization L4-S1 x 2 minute  Repeated extension followed by repeated flexion followed again with repeated extension x 10 TherEx:  Discussion of symptoms as a result of previously given home exercises x 10 minutes  Repeated knees to chest in supine position 3 x 10  Repeated knees to chest with therapist OP 2 x 10  Lower trunk rotation 2 x 10  TrA isometric 5 second hold x 20  TrA isometric with marches x  20  Sidelying clams x 20 B  Sidelying hip abduction x 20 B TherEx:  NuStep level 4   Bridges 2 x 10  Repeated knees to chest in supine position 2 x 10  Repeated knees to chest with therapist OP 2 x 10  Lower trunk rotation 2 x 10  Seated flexion with OP 2 x 10  TrA isometric 5 second hold x 20  TrA isometric with marches x 20  Sidelying clams x 10 B TherEx:  Lower extremity bike level 7 x 5 minutes   Repeated movement testing:  R side glide (hips left) 2 x 10  L side glide (hips right) 2 x 10   Repeated flexion in standing 2 x 10  Repeated knees to chest in supine position 2 x 10  Lower trunk rotation 2 x 10  TrA isometric with marches x 20  Sidelying clams with red theraband x 20  Sidelying hip abduction with red band x 20 TherEx:  Patient education on plan of care, activity modification x 15 minutes   Lower trunk rotation 2 x 10  Supine knees to chest 2 x 10  Supine flexion with therapist OP x 10  Prone laying for 3 minutes   Sidelying clams with red theraband x 20 B  Sidelying IR x 20 B   HEP:  Repeated flexion in supine x 10; lower rotation rotation; prone laying; sidelying clams and sidelying hip abduction with red band     Assessment: No significant improvement with repeated flexion in standing. Patient had discussion with patient regarding decreasing intensity level of exercises at the gym ([employer]) as patient does do high intensity exercises which could be impacting her pain levels.       Goals: (to be met in 8 visits)   Pt will demonstrate good understanding of proper posture and body mechanics to decrease pain and improve spinal safety   Pt will be able to sit for any length of time without back pain.   Pt will be independent and compliant with comprehensive HEP to maintain progress achieved in PT     Plan: Will reassess in 2 weeks at patients next appointment.    Charges: 3TE       Total Timed Treatment: 45 min  Total Treatment Time: 45 min

## 2024-01-12 ENCOUNTER — HOSPITAL ENCOUNTER (OUTPATIENT)
Dept: MRI IMAGING | Facility: HOSPITAL | Age: 49
Discharge: HOME | End: 2024-01-12
Attending: SURGERY
Payer: COMMERCIAL

## 2024-01-12 DIAGNOSIS — N60.91 ATYPICAL LOBULAR HYPERPLASIA (ALH) OF RIGHT BREAST: ICD-10-CM

## 2024-01-12 PROCEDURE — A9575 INJ GADOTERATE MEGLUMI 0.1ML: HCPCS

## 2024-01-12 PROCEDURE — 77049 MRI BREAST C-+ W/CAD BI: CPT | Performed by: SURGERY

## 2024-01-12 RX ORDER — GADOTERATE MEGLUMINE 376.9 MG/ML
15 INJECTION INTRAVENOUS
Status: COMPLETED | OUTPATIENT
Start: 2024-01-12 | End: 2024-01-12

## 2024-01-12 RX ADMIN — GADOTERATE MEGLUMINE 13 ML: 376.9 INJECTION INTRAVENOUS at 18:44:00

## 2024-01-16 ENCOUNTER — APPOINTMENT (OUTPATIENT)
Dept: PHYSICAL THERAPY | Age: 49
End: 2024-01-16
Attending: FAMILY MEDICINE
Payer: COMMERCIAL

## 2024-01-16 DIAGNOSIS — N60.91 ATYPICAL LOBULAR HYPERPLASIA (ALH) OF RIGHT BREAST: Primary | ICD-10-CM

## 2024-01-16 DIAGNOSIS — R92.1 BREAST CALCIFICATION, RIGHT: ICD-10-CM

## 2024-01-30 ENCOUNTER — OFFICE VISIT (OUTPATIENT)
Facility: LOCATION | Age: 49
End: 2024-01-30
Payer: COMMERCIAL

## 2024-01-30 VITALS — TEMPERATURE: 97 F

## 2024-01-30 DIAGNOSIS — Z12.11 SCREEN FOR COLON CANCER: Primary | ICD-10-CM

## 2024-01-30 PROCEDURE — S0285 CNSLT BEFORE SCREEN COLONOSC: HCPCS | Performed by: SURGERY

## 2024-01-30 RX ORDER — POLYETHYLENE GLYCOL 3350, SODIUM CHLORIDE, SODIUM BICARBONATE, POTASSIUM CHLORIDE 420; 11.2; 5.72; 1.48 G/4L; G/4L; G/4L; G/4L
POWDER, FOR SOLUTION ORAL
Qty: 1 EACH | Refills: 0 | Status: SHIPPED | OUTPATIENT
Start: 2024-01-30

## 2024-01-30 NOTE — H&P
Roxanna Gilliland is a 48 year old female  Chief Complaint   Patient presents with    Colonoscopy     NP- Cscope Consult- , family hx of colon cancer, denies symptoms, denies previous cscopes        REFERRED BY    Patient presents with  Roxanna Gilliland is a 48 year old female  Chief Complaint   Patient presents with    Colonoscopy     NP- Cscope Consult- , family hx of colon cancer, denies symptoms, denies previous cscopes        REFERRED BY    Patient presents for consultation for colonoscopy.  Patient denies change in bowel habits she denies chronic abdominal pain she denies unexplained weight loss.  Patient does describe hemorrhoids.  She states that she has some tissue in the perianal area which is a minor annoyance.  She is not actively seeking hemorrhoidectomy.         .         No past medical history on file.  Past Surgical History:   Procedure Laterality Date          COLPOSCOPY, CERVIX W/UPPER ADJACENT VAGINA; W/BIOPSY(S), CERVIX  2020    IAIN 1    CRYOCAUTERY OF CERVIX  2020    [facility]    OTHER SURGICAL HISTORY  child    foot surgery    WISDOM TEETH REMOVED       Current Outpatient Medications on File Prior to Visit   Medication Sig Dispense Refill    diazePAM 5 MG Oral Tab Take 1 tablet (5 mg total) by mouth 3 (three) times daily as needed for Anxiety. 5 tablet 0    propranolol 10 MG Oral Tab Take 1 tablet (10 mg total) by mouth 3 (three) times daily as needed. 90 tablet 0    Multiple Vitamins-Minerals (MULTIVITAMIN OR) Take by mouth.      CALCIUM OR Take by mouth.      Omega-3 Fatty Acids (FISH OIL OR) Take by mouth.      Cyanocobalamin (VITAMIN B 12 OR) Take by mouth.       No current facility-administered medications on file prior to visit.     @ALL@  Family History   Problem Relation Age of Onset    Hypertension Father     Lipids Father     Diabetes Father     Hypertension Mother     Colon Cancer Maternal Grandmother         dx age unknown    Breast Cancer Maternal Cousin  Female 33    No Known Problems Paternal Grandmother     No Known Problems Maternal Grandfather     No Known Problems Paternal Grandfather     No Known Problems Sister     No Known Problems Son     Heart Disease Neg     Stroke Neg      Social History     Socioeconomic History    Marital status:     Number of children: 1   Occupational History    Occupation: /computer work   Tobacco Use    Smoking status: Never    Smokeless tobacco: Never   Vaping Use    Vaping Use: Never used   Substance and Sexual Activity    Alcohol use: Never    Drug use: Never   Other Topics Concern    Caffeine Concern Yes    Exercise Yes   Social History Narrative    ** Merged History Encounter **            ROS     GENERAL HEALTH: otherwise feels well, no weight loss, no fever or chills  SKIN: denies any unusual skin rashes or jaundice  HEENT: denies nasal congestion, sinus pain or sore throat; hearing loss negative,   EYES , no diplopia or vision changes  RESPIRATORY: denies shortness of breath, wheezing or cough   CARDIOVASCULAR: denies chest pain or NGUYEN; no palpitations   GI: denies nausea, vomiting, constipation, diarrhea; no rectal bleeding; no heartburn  GENITAL/: no dysuria, urgency or frequency, no tea colored urine  MUSCULOSKELETAL: no joint complaints upper or lower extremities  HEMATOLOGY: denies hx anemia; denies bruising or excessive bleeding  Endocrine: no weight gain or loss no hot or cold intolerance    EXAM     Temperature 96.8 °F (36 °C), temperature source Temporal, not currently breastfeeding.  GENERAL: well developed, well nourished female, in no apparent distress.    MENTAL STATUS :Alert, oriented x 3  PSYCH: normal mood and affect  SKIN: anicteric, no rashes, no bruising  EYES: PERRLA, EOMI, sclera anicteric,  conjunctiva without pallor  HEENT: normocephalic, atraumatic, TMs clear, nares patent, mouth moist, pharynx w/o erythema  NECK: supple, no JVD, no adenopathy, no thyromegaly  RESPIRATORY:  clear to auscultation, no rales , rhonchi or wheezing  CARDIOVASCULAR: RRR, murmur negative  ABDOMEN: normal active BS, soft, nondistended  no HSM, no masses or hernias  LYMPHATIC: no axillary , supraclavicular or inguinal lymphadenopathy  EXTREMITIES: no cyanosis, clubbing or edema, no atrophy, full ROM    STUDIES:     Hospital Outpatient Visit on 11/15/2023   Component Date Value Ref Range Status    Case Report 11/15/2023    Final                    Value:Surgical Pathology                                Case: [id number]                                   Authorizing Provider:  Cathryn Leyva DO          Collected:           11/15/2023 03:23 PM          Ordering Location:     [facility]            Received:            11/15/2023 04:30 PM                                 Mammography                                                                  Pathologist:           Migue Wilkinson MD                                                           Specimen:    Breast, right, RIGHT BREAST 11 OCLK WITH & W/O CALCS 1 JAR                                 Final Diagnosis: 11/15/2023    Final                    Value:This result contains rich text formatting which cannot be displayed here.    Final Diagnosis Comment 11/15/2023    Final                    Value:This result contains rich text formatting which cannot be displayed here.    Ancillary Studies 11/15/2023    Final                    Value:This result contains rich text formatting which cannot be displayed here.    Clinical Information 11/15/2023    Final                    Value:This result contains rich text formatting which cannot be displayed here.    Gross Description 11/15/2023    Final                    Value:This result contains rich text formatting which cannot be displayed here.    Interpretation 11/15/2023 Abnormal (A)    Final       ASSESSMENT   Imp: Screening colonoscopy will discuss with patient that day of procedure whether she has a family history  of colon cancer or colon polyps  PLan: Colonoscopy discussed with patient risk of bleeding and bowel perforation with surgery to repair      Meds & Refills for this Visit:  Requested Prescriptions     Signed Prescriptions Disp Refills    PEG 3350-KCl-Na Bicarb-NaCl (TRILYTE) 420 g Oral Recon Soln 1 each 0     Sig: Starting at 4:00 pm the night before procedure, drink 8 ounces of the prep every 15-20 minutes until finished       Imaging & Consults:  None       .         No past medical history on file.  Past Surgical History:   Procedure Laterality Date          COLPOSCOPY, CERVIX W/UPPER ADJACENT VAGINA; W/BIOPSY(S), CERVIX  2020    IAIN 1    CRYOCAUTERY OF CERVIX  2020    Cryo    OTHER SURGICAL HISTORY  child    foot surgery    WISDOM TEETH REMOVED       Current Outpatient Medications on File Prior to Visit   Medication Sig Dispense Refill    diazePAM 5 MG Oral Tab Take 1 tablet (5 mg total) by mouth 3 (three) times daily as needed for Anxiety. 5 tablet 0    propranolol 10 MG Oral Tab Take 1 tablet (10 mg total) by mouth 3 (three) times daily as needed. 90 tablet 0    Multiple Vitamins-Minerals (MULTIVITAMIN OR) Take by mouth.      CALCIUM OR Take by mouth.      Omega-3 Fatty Acids (FISH OIL OR) Take by mouth.      Cyanocobalamin (VITAMIN B 12 OR) Take by mouth.       No current facility-administered medications on file prior to visit.     @ALL@  Family History   Problem Relation Age of Onset    Hypertension Father     Lipids Father     Diabetes Father     Hypertension Mother     Colon Cancer Maternal Grandmother         dx age unknown    Breast Cancer Maternal Cousin Female 33    No Known Problems Paternal Grandmother     No Known Problems Maternal Grandfather     No Known Problems Paternal Grandfather     No Known Problems Sister     No Known Problems Son     Heart Disease Neg     Stroke Neg      Social History     Socioeconomic History    Marital status:     Number of children: 1    Occupational History    Occupation: /computer work   Tobacco Use    Smoking status: Never    Smokeless tobacco: Never   Vaping Use    Vaping Use: Never used   Substance and Sexual Activity    Alcohol use: Never    Drug use: Never   Other Topics Concern    Caffeine Concern Yes    Exercise Yes   Social History Narrative    ** Merged History Encounter **            ROS     GENERAL HEALTH: otherwise feels well, no weight loss, no fever or chills  SKIN: denies any unusual skin rashes or jaundice  HEENT: denies nasal congestion, sinus pain or sore throat; hearing loss negative,   EYES , no diplopia or vision changes  RESPIRATORY: denies shortness of breath, wheezing or cough   CARDIOVASCULAR: denies chest pain or NGUYEN; no palpitations   GI: denies nausea, vomiting, constipation, diarrhea; no rectal bleeding; no heartburn  GENITAL/: no dysuria, urgency or frequency, no tea colored urine  MUSCULOSKELETAL: no joint complaints upper or lower extremities  HEMATOLOGY: denies hx anemia; denies bruising or excessive bleeding  Endocrine: no weight gain or loss no hot or cold intolerance    EXAM     Temperature 96.8 °F (36 °C), temperature source Temporal, not currently breastfeeding.  GENERAL: well developed, well nourished female, in no apparent distress.    MENTAL STATUS :Alert, oriented x 3  PSYCH: normal mood and affect  SKIN: anicteric, no rashes, no bruising  EYES: PERRLA, EOMI, sclera anicteric,  conjunctiva without pallor  HEENT: normocephalic, atraumatic, TMs clear, nares patent, mouth moist, pharynx w/o erythema  NECK: supple, no JVD, no adenopathy, no thyromegaly  RESPIRATORY: clear to auscultation, no rales , rhonchi or wheezing  CARDIOVASCULAR: RRR, murmur negative  ABDOMEN: normal active BS, soft, nondistended  no HSM, no masses or hernias  LYMPHATIC: no axillary , supraclavicular or inguinal lymphadenopathy  EXTREMITIES: no cyanosis, clubbing or edema, no atrophy, full ROM    STUDIES:      Hospital Outpatient Visit on 11/15/2023   Component Date Value Ref Range Status    Case Report 11/15/2023    Final                    Value:Surgical Pathology                                Case: [id number]                                   Authorizing Provider:  Cathryn Leyva DO          Collected:           11/15/2023 03:23 PM          Ordering Location:     [facility]            Received:            11/15/2023 04:30 PM                                 Mammography                                                                  Pathologist:           Migue Wilkinson MD                                                           Specimen:    Breast, right, RIGHT BREAST 11 OCLK WITH & W/O CALCS 1 JAR                                 Final Diagnosis: 11/15/2023    Final                    Value:This result contains rich text formatting which cannot be displayed here.    Final Diagnosis Comment 11/15/2023    Final                    Value:This result contains rich text formatting which cannot be displayed here.    Ancillary Studies 11/15/2023    Final                    Value:This result contains rich text formatting which cannot be displayed here.    Clinical Information 11/15/2023    Final                    Value:This result contains rich text formatting which cannot be displayed here.    Gross Description 11/15/2023    Final                    Value:This result contains rich text formatting which cannot be displayed here.    Interpretation 11/15/2023 Abnormal (A)    Final       ASSESSMENT   Imp: Average risk screening colonoscopy  PLan: Colonoscopy discussed with patient risk of bleeding a bowel perforation with surgery to repair      Meds & Refills for this Visit:  Requested Prescriptions     Signed Prescriptions Disp Refills    PEG 3350-KCl-Na Bicarb-NaCl (TRILYTE) 420 g Oral Recon Soln 1 each 0     Sig: Starting at 4:00 pm the night before procedure, drink 8 ounces of the prep every 15-20 minutes until  finished       Imaging & Consults:  None

## 2024-02-09 ENCOUNTER — OFFICE VISIT (OUTPATIENT)
Dept: PAIN CLINIC | Facility: CLINIC | Age: 49
End: 2024-02-09
Payer: COMMERCIAL

## 2024-02-09 VITALS
WEIGHT: 150 LBS | OXYGEN SATURATION: 99 % | DIASTOLIC BLOOD PRESSURE: 82 MMHG | SYSTOLIC BLOOD PRESSURE: 138 MMHG | BODY MASS INDEX: 27 KG/M2 | HEART RATE: 68 BPM

## 2024-02-09 DIAGNOSIS — M47.816 LUMBAR FACET ARTHROPATHY: Primary | ICD-10-CM

## 2024-02-09 PROCEDURE — 99204 OFFICE O/P NEW MOD 45 MIN: CPT | Performed by: ANESTHESIOLOGY

## 2024-02-09 PROCEDURE — 3079F DIAST BP 80-89 MM HG: CPT | Performed by: ANESTHESIOLOGY

## 2024-02-09 PROCEDURE — 3075F SYST BP GE 130 - 139MM HG: CPT | Performed by: ANESTHESIOLOGY

## 2024-02-09 RX ORDER — MELOXICAM 15 MG/1
15 TABLET ORAL DAILY
Qty: 30 TABLET | Refills: 0 | Status: SHIPPED | OUTPATIENT
Start: 2024-02-09

## 2024-02-09 RX ORDER — CYCLOBENZAPRINE HCL 10 MG
10 TABLET ORAL NIGHTLY
Qty: 21 TABLET | Refills: 0 | Status: SHIPPED | OUTPATIENT
Start: 2024-02-09

## 2024-02-09 NOTE — PATIENT INSTRUCTIONS
Refill policies:    Allow 2-3 business days for refills; controlled substances may take longer.  Contact your pharmacy at least 5 days prior to running out of medication and have them send an electronic request or submit request through the “request refill” option in your [employer] account.  Refills are not addressed on weekends; covering physicians do not authorize routine medications on weekends.  No narcotics or controlled substances are refilled after noon on Fridays or by on call physicians.  By law, narcotics must be electronically prescribed.  A 30 day supply with no refills is the maximum allowed.  If your prescription is due for a refill, you may be due for a follow up appointment.  To best provide you care, patients receiving routine medications need to be seen at least once a year.  Patients receiving narcotic/controlled substance medications need to be seen at least once every 3 months.  In the event that your preferred pharmacy does not have the requested medication in stock (e.g. Backordered), it is your responsibility to find another pharmacy that has the requested medication available.  We will gladly send a new prescription to that pharmacy at your request.    Scheduling Tests:    If your physician has ordered radiology tests such as MRI or CT scans, please contact Central Scheduling at [phone] right away to schedule the test.  Once scheduled, the [facility] Centralized Referral Team will work with your insurance carrier to obtain pre-certification or prior authorization.  Depending on your insurance carrier, approval may take 3-10 days.  It is highly recommended patients assure they have received an authorization before having a test performed.  If test is done without insurance authorization, patient may be responsible for the entire amount billed.      Precertification and Prior Authorizations:  If your physician has recommended that you have a procedure or additional testing performed the [facility]  Centralized Referral Team will contact your insurance carrier to obtain pre-certification or prior authorization.    You are strongly encouraged to contact your insurance carrier to verify that your procedure/test has been approved and is a COVERED benefit.  Although the [facility] Centralized Referral Team does its due diligence, the insurance carrier gives the disclaimer that \"Although the procedure is authorized, this does not guarantee payment.\"    Ultimately the patient is responsible for payment.   Thank you for your understanding in this matter.  Paperwork Completion:  If you require FMLA or disability paperwork for your recovery, please make sure to either drop it off or have it faxed to our office at [phone]. Be sure the form has your name and date of birth on it.  The form will be faxed to our Forms Department and they will complete it for you.  There is a 25$ fee for all forms that need to be filled out.  Please be aware there is a 10-14 day turnaround time.  You will need to sign a release of information (LIGIA) form if your paperwork does not come with one.  You may call the Forms Department with any questions at [phone].  Their fax number is [phone].

## 2024-02-09 NOTE — PROGRESS NOTES
Subjective:   Patient ID: Roxanna Gilliland is a 48 year old female.    HPI    History/Other:   Review of Systems  Current Outpatient Medications   Medication Sig Dispense Refill    PEG 3350-KCl-Na Bicarb-NaCl (TRILYTE) 420 g Oral Recon Soln Starting at 4:00 pm the night before procedure, drink 8 ounces of the prep every 15-20 minutes until finished 1 each 0    diazePAM 5 MG Oral Tab Take 1 tablet (5 mg total) by mouth 3 (three) times daily as needed for Anxiety. 5 tablet 0    propranolol 10 MG Oral Tab Take 1 tablet (10 mg total) by mouth 3 (three) times daily as needed. 90 tablet 0    Multiple Vitamins-Minerals (MULTIVITAMIN OR) Take by mouth.      CALCIUM OR Take by mouth.      Omega-3 Fatty Acids (FISH OIL OR) Take by mouth.      Cyanocobalamin (VITAMIN B 12 OR) Take by mouth.       Allergies:  Allergies   Allergen Reactions    Amoxicillin RASH       Objective:   Physical Exam  Constitutional:              Assessment & Plan:   No diagnosis found.    No orders of the defined types were placed in this encounter.      Meds This Visit:  Requested Prescriptions      No prescriptions requested or ordered in this encounter       Imaging & Referrals:  None      Location of Pain: low back    Date Pain Began: 1.5 yr          Work Related:   No        Receiving Work Comp/Disability:   No    Numeric Rating Scale:  Pain at Present:  5                                                                                                            (No Pain) 0  to  10 (Worst Pain)                 Minimum Pain:   5  Maximum Pain  8    Distribution of Pain:    bilateral    Quality of Pain:   aching    Origin of Pain:    Degenerative    Aggravating Factors:    Sitting, exercises    Past Treatments for Current Pain Condition:   Physical Therapy and NSAIDS    Prior diagnostic testing for your pain:  MRI

## 2024-02-09 NOTE — H&P
Name: Roxanna Gilliland   : 10/14/1975   DOS: 2024     Chief complaint: Low back pain    History of present illness:  Roxanna Gilliland is a 48 year old female with no significant past medical history who presents today for evaluation low back pain.  Patient complains of bilateral back pain which is described as deep and aching.  This has been progressive over the course of the last 1.5 years.  Pain is made worse by sitting and exercise.  Patient has completed physical therapy and nonsteroidals without improvement.  Rates pain as 5 out of 10 with exacerbation to 8 out of 10.  She denies any chills, fever or weakness. She denies any bladder or bowel incontinence.      No past medical history on file.   Current Outpatient Medications   Medication Sig Dispense Refill    Meloxicam 15 MG Oral Tab Take 1 tablet (15 mg total) by mouth daily. 30 tablet 0    cyclobenzaprine 10 MG Oral Tab Take 1 tablet (10 mg total) by mouth nightly. 21 tablet 0    PEG 3350-KCl-Na Bicarb-NaCl (TRILYTE) 420 g Oral Recon Soln Starting at 4:00 pm the night before procedure, drink 8 ounces of the prep every 15-20 minutes until finished 1 each 0    propranolol 10 MG Oral Tab Take 1 tablet (10 mg total) by mouth 3 (three) times daily as needed. 90 tablet 0    Multiple Vitamins-Minerals (MULTIVITAMIN OR) Take by mouth.      CALCIUM OR Take by mouth.      Omega-3 Fatty Acids (FISH OIL OR) Take by mouth.      Cyanocobalamin (VITAMIN B 12 OR) Take by mouth.       Past Surgical History:   Procedure Laterality Date          COLPOSCOPY, CERVIX W/UPPER ADJACENT VAGINA; W/BIOPSY(S), CERVIX  2020    IAIN 1    CRYOCAUTERY OF CERVIX  2020    Cryo    OTHER SURGICAL HISTORY  child    foot surgery    WISDOM TEETH REMOVED        Family History   Problem Relation Age of Onset    Hypertension Father     Lipids Father     Diabetes Father     Hypertension Mother     Colon Cancer Maternal Grandmother         dx age unknown    Breast  Cancer Maternal Cousin Female 33    No Known Problems Paternal Grandmother     No Known Problems Maternal Grandfather     No Known Problems Paternal Grandfather     No Known Problems Sister     No Known Problems Son     Heart Disease Neg     Stroke Neg      Social History     Socioeconomic History    Marital status:     Number of children: 1   Occupational History    Occupation: /computer work   Tobacco Use    Smoking status: Never    Smokeless tobacco: Never   Vaping Use    Vaping Use: Never used   Substance and Sexual Activity    Alcohol use: Never    Drug use: Never   Other Topics Concern    Caffeine Concern Yes    Exercise Yes   Social History Narrative    ** Merged History Encounter **            Review of  other systems:  10 point ROS otherwise negative    Physical examination: Roxanna is a 48 year old female not in acute distress  /82   Pulse 68   Wt 150 lb (68 kg)   SpO2 99%   BMI 26.57 kg/m²    The patient is awake, alert, oriented and corporative. She has a normal affect. The patient ambulates with normal gait.  HEENT: No gross lesion noted. PEERL. No icterus.  Neck and Upper Extremity: Supple. No thyromegaly or lymphadenopathy.   Motor Examination:    (R)   (L)  Deltoid:      5    5  Biceps:       5    5  Triceps:      5    5  Wrist Extension:     5    5  Wrist Flexsion:     5    5  Finger Extension:     5    5  Finger Flexsion:     5    5       Cardiovascular system: No peripheral edema  Respiratory system: Speech nonlabored  Abdomen: Soft, nontender  Neurologic:  Cranial nerves II through XII are grossly intact.       Examination of the lower back:     No pain on palpation spinous processes on the midline.  Straight leg raise is negative.  Facet loading positive bilaterally      Motor:      (R)   (L)   Hip Abduction:   5    5   Hip Flexion:    5    5   Knee Extension:   5    5   Knee Flexion:    5    5   Ant. Tibialis:    5    5  Extensor Hallucis Longus:   5     5  Peroneals:     5    5  Gastrocsoleus:     5    5       Radiology diagnostic studies:   Lumbar MRI reviewed independently with patient.  Evidence of degenerative changes most significant L4 and L5.  There is not significant central canal stenosis or foraminal stenosis.  There is evidence of facet arthropathy with facet joint edema    Assessment:  Encounter Diagnosis   Name Primary?    Lumbar facet arthropathy Yes   .      Plan:     The patient is very pleasant 48-year-old female with a history of low back pain.  This has been ongoing over the course of the past 1.5 years.  Patient denies any radicular symptoms.  Does have positive facet loading today.  Her pain is consistent with facet arthropathy.  Discussed treatment options including medial branch block and radiofrequency ablation.  Also discussed conservative measures including nonsteroidals and muscle relaxer.  Patient would like to try medication management prior to any injections.  Patient to follow-up in 4 to 6 weeks.    Luis Armando Snyder MD MPH  Pain Management

## 2024-03-05 RX ORDER — MELOXICAM 15 MG/1
15 TABLET ORAL DAILY
Qty: 30 TABLET | Refills: 0 | Status: SHIPPED | OUTPATIENT
Start: 2024-03-05

## 2024-03-05 NOTE — TELEPHONE ENCOUNTER
Medication: Meloxicam 15 MG Oral Tab     Date of last refill: 2/9/24    Last office visit: 2/9/24  Due back to clinic per last office note:  4-6 weeks  Date next office visit scheduled:  none      Last OV note recommendation: The patient is very pleasant 48-year-old female with a history of low back pain.  This has been ongoing over the course of the past 1.5 years.  Patient denies any radicular symptoms.  Does have positive facet loading today.  Her pain is consistent with facet arthropathy.  Discussed treatment options including medial branch block and radiofrequency ablation.  Also discussed conservative measures including nonsteroidals and muscle relaxer.  Patient would like to try medication management prior to any injections.  Patient to follow-up in 4 to 6 weeks.

## 2024-03-07 ENCOUNTER — TELEPHONE (OUTPATIENT)
Facility: LOCATION | Age: 49
End: 2024-03-07

## 2024-03-07 NOTE — TELEPHONE ENCOUNTER
No prior authorization required for cpt code 48208. Ref #: [id number]. Spoke with Neo MEEKS @ 3:45pm on 3/7

## 2024-04-30 ENCOUNTER — LAB ENCOUNTER (OUTPATIENT)
Dept: LAB | Age: 49
End: 2024-04-30
Attending: FAMILY MEDICINE
Payer: COMMERCIAL

## 2024-04-30 ENCOUNTER — OFFICE VISIT (OUTPATIENT)
Dept: FAMILY MEDICINE CLINIC | Facility: CLINIC | Age: 49
End: 2024-04-30
Payer: COMMERCIAL

## 2024-04-30 VITALS
DIASTOLIC BLOOD PRESSURE: 74 MMHG | HEART RATE: 56 BPM | OXYGEN SATURATION: 98 % | BODY MASS INDEX: 26.93 KG/M2 | WEIGHT: 152 LBS | HEIGHT: 63 IN | RESPIRATION RATE: 16 BRPM | SYSTOLIC BLOOD PRESSURE: 120 MMHG

## 2024-04-30 DIAGNOSIS — R10.9 RIGHT FLANK PAIN: ICD-10-CM

## 2024-04-30 DIAGNOSIS — R10.13 EPIGASTRIC PAIN: ICD-10-CM

## 2024-04-30 DIAGNOSIS — R07.9 CHEST PAIN, UNSPECIFIED TYPE: Primary | ICD-10-CM

## 2024-04-30 DIAGNOSIS — R42 DIZZINESS: ICD-10-CM

## 2024-04-30 DIAGNOSIS — R07.9 CHEST PAIN, UNSPECIFIED TYPE: ICD-10-CM

## 2024-04-30 LAB
ALBUMIN SERPL-MCNC: 3.5 G/DL (ref 3.4–5)
ALBUMIN/GLOB SERPL: 0.9 {RATIO} (ref 1–2)
ALP LIVER SERPL-CCNC: 46 U/L
ALT SERPL-CCNC: 21 U/L
ANION GAP SERPL CALC-SCNC: 0 MMOL/L (ref 0–18)
AST SERPL-CCNC: 22 U/L (ref 15–37)
BASOPHILS # BLD AUTO: 0.06 X10(3) UL (ref 0–0.2)
BASOPHILS NFR BLD AUTO: 1.2 %
BILIRUB SERPL-MCNC: 0.4 MG/DL (ref 0.1–2)
BILIRUB UR QL STRIP.AUTO: NEGATIVE
BUN BLD-MCNC: 13 MG/DL (ref 9–23)
CALCIUM BLD-MCNC: 9.1 MG/DL (ref 8.5–10.1)
CHLORIDE SERPL-SCNC: 108 MMOL/L (ref 98–112)
CLARITY UR REFRACT.AUTO: CLEAR
CO2 SERPL-SCNC: 31 MMOL/L (ref 21–32)
COLOR UR AUTO: COLORLESS
CREAT BLD-MCNC: 0.83 MG/DL
D DIMER PPP FEU-MCNC: <0.27 UG/ML FEU (ref ?–0.5)
EGFRCR SERPLBLD CKD-EPI 2021: 86 ML/MIN/1.73M2 (ref 60–?)
EOSINOPHIL # BLD AUTO: 0.06 X10(3) UL (ref 0–0.7)
EOSINOPHIL NFR BLD AUTO: 1.2 %
ERYTHROCYTE [DISTWIDTH] IN BLOOD BY AUTOMATED COUNT: 12.3 %
FASTING STATUS PATIENT QL REPORTED: NO
GLOBULIN PLAS-MCNC: 3.7 G/DL (ref 2.8–4.4)
GLUCOSE BLD-MCNC: 97 MG/DL (ref 70–99)
GLUCOSE UR STRIP.AUTO-MCNC: NORMAL MG/DL
HCT VFR BLD AUTO: 43.2 %
HGB BLD-MCNC: 14.1 G/DL
IMM GRANULOCYTES # BLD AUTO: 0.01 X10(3) UL (ref 0–1)
IMM GRANULOCYTES NFR BLD: 0.2 %
KETONES UR STRIP.AUTO-MCNC: NEGATIVE MG/DL
LEUKOCYTE ESTERASE UR QL STRIP.AUTO: NEGATIVE
LIPASE SERPL-CCNC: 33 U/L (ref ?–300)
LYMPHOCYTES # BLD AUTO: 1.57 X10(3) UL (ref 1–4)
LYMPHOCYTES NFR BLD AUTO: 31.9 %
MCH RBC QN AUTO: 31.1 PG (ref 26–34)
MCHC RBC AUTO-ENTMCNC: 32.6 G/DL (ref 31–37)
MCV RBC AUTO: 95.2 FL
MONOCYTES # BLD AUTO: 0.46 X10(3) UL (ref 0.1–1)
MONOCYTES NFR BLD AUTO: 9.3 %
NEUTROPHILS # BLD AUTO: 2.76 X10 (3) UL (ref 1.5–7.7)
NEUTROPHILS # BLD AUTO: 2.76 X10(3) UL (ref 1.5–7.7)
NEUTROPHILS NFR BLD AUTO: 56.2 %
NITRITE UR QL STRIP.AUTO: NEGATIVE
OSMOLALITY SERPL CALC.SUM OF ELEC: 288 MOSM/KG (ref 275–295)
PH UR STRIP.AUTO: 6.5 [PH] (ref 5–8)
PLATELET # BLD AUTO: 292 10(3)UL (ref 150–450)
POTASSIUM SERPL-SCNC: 4.6 MMOL/L (ref 3.5–5.1)
PROT SERPL-MCNC: 7.2 G/DL (ref 6.4–8.2)
PROT UR STRIP.AUTO-MCNC: NEGATIVE MG/DL
RBC # BLD AUTO: 4.54 X10(6)UL
RBC UR QL AUTO: NEGATIVE
SODIUM SERPL-SCNC: 139 MMOL/L (ref 136–145)
SP GR UR STRIP.AUTO: 1.01 (ref 1–1.03)
T4 FREE SERPL-MCNC: 1.1 NG/DL (ref 0.8–1.7)
TSI SER-ACNC: 0.93 MIU/ML (ref 0.36–3.74)
UROBILINOGEN UR STRIP.AUTO-MCNC: NORMAL MG/DL
WBC # BLD AUTO: 4.9 X10(3) UL (ref 4–11)

## 2024-04-30 PROCEDURE — 84439 ASSAY OF FREE THYROXINE: CPT | Performed by: FAMILY MEDICINE

## 2024-04-30 PROCEDURE — 96127 BRIEF EMOTIONAL/BEHAV ASSMT: CPT | Performed by: FAMILY MEDICINE

## 2024-04-30 PROCEDURE — 99214 OFFICE O/P EST MOD 30 MIN: CPT | Performed by: FAMILY MEDICINE

## 2024-04-30 PROCEDURE — 80050 GENERAL HEALTH PANEL: CPT | Performed by: FAMILY MEDICINE

## 2024-04-30 PROCEDURE — 3078F DIAST BP <80 MM HG: CPT | Performed by: FAMILY MEDICINE

## 2024-04-30 PROCEDURE — 81003 URINALYSIS AUTO W/O SCOPE: CPT | Performed by: FAMILY MEDICINE

## 2024-04-30 PROCEDURE — 85379 FIBRIN DEGRADATION QUANT: CPT | Performed by: FAMILY MEDICINE

## 2024-04-30 PROCEDURE — 83690 ASSAY OF LIPASE: CPT | Performed by: FAMILY MEDICINE

## 2024-04-30 PROCEDURE — 3074F SYST BP LT 130 MM HG: CPT | Performed by: FAMILY MEDICINE

## 2024-04-30 PROCEDURE — 3008F BODY MASS INDEX DOCD: CPT | Performed by: FAMILY MEDICINE

## 2024-04-30 RX ORDER — OMEPRAZOLE 40 MG/1
40 CAPSULE, DELAYED RELEASE ORAL DAILY
Qty: 30 CAPSULE | Refills: 2 | Status: SHIPPED | OUTPATIENT
Start: 2024-04-30

## 2024-04-30 NOTE — PROGRESS NOTES
Roxanna Gilliland is a 48 year old female here to discuss MMP    Lumbar pain - given meloxicam by a pain specialist   Pt started meds in early march     Pt was having abdominal pain   Took a break after a c-scope   Stopped in mid April   Right flank pain / sharp / no OTC meds   Normal appetite   Staying hydrated   No fever   No diarrhea nor constipation   Pt reports it can be stabbing pain   No kidney stone   No urinary symptoms   Pt was sometimes right lower quadrant   GB intact   No family h/o CAD     Sunday pt started to feel some chest pain with dizziness -  shooting  with radiation to left arm and right shoulder  No Sob     Pt has been more anxious   Pt had racing heart   Propranolol did help some   Some episodes of dizziness       HPI:     Current Outpatient Medications   Medication Sig Dispense Refill    Omeprazole 40 MG Oral Capsule Delayed Release Take 1 capsule (40 mg total) by mouth daily. 30 capsule 2    cyclobenzaprine 10 MG Oral Tab Take 1 tablet (10 mg total) by mouth nightly. (Patient taking differently: Take 1 tablet (10 mg total) by mouth nightly. prn) 21 tablet 0    propranolol 10 MG Oral Tab Take 1 tablet (10 mg total) by mouth 3 (three) times daily as needed. 90 tablet 0    Multiple Vitamins-Minerals (MULTIVITAMIN OR) Take by mouth.      CALCIUM OR Take by mouth.      Omega-3 Fatty Acids (FISH OIL OR) Take by mouth.      Cyanocobalamin (VITAMIN B 12 OR) Take by mouth.      PEG 3350-KCl-Na Bicarb-NaCl (TRILYTE) 420 g Oral Recon Soln Starting at 4:00 pm the night before procedure, drink 8 ounces of the prep every 15-20 minutes until finished 1 each 0      No past medical history on file.   Social History:  Social History     Socioeconomic History    Marital status:     Number of children: 1   Occupational History    Occupation: /computer work   Tobacco Use    Smoking status: Never    Smokeless tobacco: Never   Vaping Use    Vaping status: Never Used   Substance and  Sexual Activity    Alcohol use: Never    Drug use: Never   Other Topics Concern    Caffeine Concern Yes    Exercise Yes   Social History Narrative    ** Merged History Encounter **             REVIEW OF SYSTEMS:   GENERAL HEALTH: feels well otherwise  SKIN: denies any unusual skin lesions or rashes  RESPIRATORY: denies shortness of breath with exertion  CARDIOVASCULAR: denies chest pain on exertion  GI: denies abdominal pain and denies heartburn  NEURO: denies headaches      EXAM:   /74   Pulse 56   Resp 16   Ht 5' 3\" (1.6 m)   Wt 152 lb (68.9 kg)   LMP 04/15/2024   SpO2 98%   BMI 26.93 kg/m²   GENERAL: well developed, well nourished,in no apparent distress  PSYCH: normal affect, good eye contact, appropriate  NEURO: CN intact  NECK: supple no LAD, no thyromegaly   HEART: normal   LUNGS: clear   CHEST: no reproducible pain   HEENT: TM's and OP clear   BACK: no CVA tenderness  ABD: + BS soft NT ND No RRG   LE: no edema     ASSESSMENT AND PLAN:     1. Chest pain, unspecified type    - EKG 12 Lead performed at [facility]; Future  - CARD ECHO STRESS ECHO/REST AND STRESS(CPT=93350/14069 DMG 55601); Future  - Free T4, (Free Thyroxine); Future  - Assay, Thyroid Stim Hormone; Future  - Comp Metabolic Panel (14); Future  - CBC With Differential With Platelet; Future  - D-Dimer [E]; Future    2. Dizziness    - EKG 12 Lead performed at [facility]; Future  - CARD ECHO STRESS ECHO/REST AND STRESS(CPT=93350/30684 DMG 95370); Future  - Free T4, (Free Thyroxine); Future  - Assay, Thyroid Stim Hormone; Future  - Comp Metabolic Panel (14); Future  - CBC With Differential With Platelet; Future  - D-Dimer [E]; Future    3. Epigastric pain    - z [facility] US ABDOMEN COMPLETE (CPT=76700); Future  - Lipase [E]; Future  - Omeprazole 40 MG Oral Capsule Delayed Release; Take 1 capsule (40 mg total) by mouth daily.  Dispense: 30 capsule; Refill: 2  - z [facility] US ABDOMEN COMPLETE (CPT=76700)    4. Right flank pain    - z  [facility] US ABDOMEN COMPLETE (CPT=76700); Future  - Urinalysis, Routine [E]; Future  - Urine Culture, Routine [E]; Future  - z [facility] US ABDOMEN COMPLETE (CPT=76700)        Potential side effects discussed at length  The patient indicates understanding of these issues and agrees to the plan.  The patient is asked to return in 1 mo or sooner if needed

## 2024-05-06 ENCOUNTER — HOSPITAL ENCOUNTER (OUTPATIENT)
Dept: CV DIAGNOSTICS | Age: 49
Discharge: HOME | End: 2024-05-06
Attending: FAMILY MEDICINE
Payer: COMMERCIAL

## 2024-05-06 DIAGNOSIS — R07.9 CHEST PAIN, UNSPECIFIED TYPE: ICD-10-CM

## 2024-05-06 DIAGNOSIS — R42 DIZZINESS: ICD-10-CM

## 2024-05-06 PROCEDURE — 93350 STRESS TTE ONLY: CPT | Performed by: FAMILY MEDICINE

## 2024-05-06 PROCEDURE — 93017 CV STRESS TEST TRACING ONLY: CPT | Performed by: FAMILY MEDICINE

## 2024-05-06 PROCEDURE — 93018 CV STRESS TEST I&R ONLY: CPT | Performed by: FAMILY MEDICINE

## 2024-05-15 ENCOUNTER — TELEPHONE (OUTPATIENT)
Facility: LOCATION | Age: 49
End: 2024-05-15

## 2024-07-19 ENCOUNTER — OFFICE VISIT (OUTPATIENT)
Dept: FAMILY MEDICINE CLINIC | Facility: CLINIC | Age: 49
End: 2024-07-19
Payer: COMMERCIAL

## 2024-07-19 VITALS
BODY MASS INDEX: 26.58 KG/M2 | HEART RATE: 72 BPM | WEIGHT: 150 LBS | HEIGHT: 63 IN | SYSTOLIC BLOOD PRESSURE: 136 MMHG | DIASTOLIC BLOOD PRESSURE: 70 MMHG | RESPIRATION RATE: 16 BRPM | OXYGEN SATURATION: 98 %

## 2024-07-19 DIAGNOSIS — R35.0 FREQUENCY OF URINATION: ICD-10-CM

## 2024-07-19 DIAGNOSIS — R10.2 VAGINAL PAIN: Primary | ICD-10-CM

## 2024-07-19 LAB
APPEARANCE: CLEAR
BILIRUBIN: NEGATIVE
GLUCOSE (URINE DIPSTICK): NEGATIVE MG/DL
KETONES (URINE DIPSTICK): NEGATIVE MG/DL
LEUKOCYTES: NEGATIVE
MULTISTIX LOT#: NORMAL NUMERIC
NITRITE, URINE: NEGATIVE
OCCULT BLOOD: NEGATIVE
PH, URINE: 5 (ref 4.5–8)
PROTEIN (URINE DIPSTICK): NEGATIVE MG/DL
SPECIFIC GRAVITY: 1.02 (ref 1–1.03)
URINE-COLOR: YELLOW
UROBILINOGEN,SEMI-QN: 0.2 MG/DL (ref 0–1.9)

## 2024-07-19 PROCEDURE — 3075F SYST BP GE 130 - 139MM HG: CPT | Performed by: PHYSICIAN ASSISTANT

## 2024-07-19 PROCEDURE — 99213 OFFICE O/P EST LOW 20 MIN: CPT | Performed by: PHYSICIAN ASSISTANT

## 2024-07-19 PROCEDURE — 81514 NFCT DS BV&VAGINITIS DNA ALG: CPT | Performed by: PHYSICIAN ASSISTANT

## 2024-07-19 PROCEDURE — 81003 URINALYSIS AUTO W/O SCOPE: CPT | Performed by: PHYSICIAN ASSISTANT

## 2024-07-19 PROCEDURE — 3078F DIAST BP <80 MM HG: CPT | Performed by: PHYSICIAN ASSISTANT

## 2024-07-19 PROCEDURE — 3008F BODY MASS INDEX DOCD: CPT | Performed by: PHYSICIAN ASSISTANT

## 2024-07-19 RX ORDER — FLUCONAZOLE 150 MG/1
TABLET ORAL
Qty: 2 TABLET | Refills: 0 | Status: SHIPPED | OUTPATIENT
Start: 2024-07-19

## 2024-07-19 NOTE — PROGRESS NOTES
Subjective:   Patient ID: Roxanna Gilliland is a 48 year old female.    HPI  Patient presents c/o dyspareunia  About 3 weeks ago felt like she had yeast infection  Tried 3 days monistat and now those symptoms are gone  However since then she has been having a lot of discomfort with intercourse  Feels the pain is worse with deeper penetration  Has happened once before and improved on its own  This time symptoms are lasting longer and not going away  No vaginal bleeding during or after intercourse  No other symptoms otherwise like pelvic pain/pressure, urinary symptoms  She is in perimenopause  Pap done last October and normal    History/Other:   Review of Systems   Constitutional:  Negative for chills, diaphoresis and fever.   Genitourinary:  Positive for dyspareunia. Negative for decreased urine volume, difficulty urinating, dysuria, flank pain, frequency, hematuria, pelvic pain, urgency, vaginal bleeding, vaginal discharge and vaginal pain.     Current Outpatient Medications   Medication Sig Dispense Refill    propranolol 10 MG Oral Tab Take 1 tablet (10 mg total) by mouth 3 (three) times daily as needed. 90 tablet 0    Multiple Vitamins-Minerals (MULTIVITAMIN OR) Take by mouth.      CALCIUM OR Take by mouth.      Omega-3 Fatty Acids (FISH OIL OR) Take by mouth.      Cyanocobalamin (VITAMIN B 12 OR) Take by mouth.      cyclobenzaprine 10 MG Oral Tab Take 1 tablet (10 mg total) by mouth nightly. (Patient not taking: Reported on 7/19/2024) 21 tablet 0     Allergies:  Allergies   Allergen Reactions    Amoxicillin RASH       Objective:   Physical Exam  Vitals and nursing note reviewed.   Constitutional:       Appearance: Normal appearance.   Genitourinary:     Vagina: Vaginal discharge (thick, white/yellow, clumpy discharge in the vaginal canal) present. No erythema, tenderness or bleeding.      Cervix: Normal. No cervical motion tenderness.      Uterus: Normal.       Adnexa: Right adnexa normal and left adnexa  normal.   Neurological:      Mental Status: She is alert.         Assessment & Plan:   1. Vaginal pain  Patient here with few weeks of dyspareunia after treating with otc monistat. On exam she has thick white/yellow clumpy discharge in vaginal canal, suspicious for yeast infection. Will check vaginal culture and contact patient with results. Empirically tx with fluconazole as directed.   - Vaginitis Vaginosis PCR Panel; Future  - fluconazole 150 MG Oral Tab; Take 1 tablet by mouth once. Repeat in 72 hours if symptoms persist.  Dispense: 2 tablet; Refill: 0

## 2024-07-20 LAB
BV BACTERIA DNA VAG QL NAA+PROBE: NEGATIVE
C GLABRATA DNA VAG QL NAA+PROBE: NEGATIVE
C KRUSEI DNA VAG QL NAA+PROBE: NEGATIVE
CANDIDA DNA VAG QL NAA+PROBE: NEGATIVE
T VAGINALIS DNA VAG QL NAA+PROBE: NEGATIVE

## 2024-08-15 ENCOUNTER — HOSPITAL ENCOUNTER (OUTPATIENT)
Dept: ULTRASOUND IMAGING | Age: 49
Discharge: HOME | End: 2024-08-15
Attending: PHYSICIAN ASSISTANT
Payer: COMMERCIAL

## 2024-08-15 DIAGNOSIS — N94.10 DYSPAREUNIA IN FEMALE: ICD-10-CM

## 2024-08-15 PROCEDURE — 76856 US EXAM PELVIC COMPLETE: CPT | Performed by: PHYSICIAN ASSISTANT

## 2024-08-15 PROCEDURE — 76830 TRANSVAGINAL US NON-OB: CPT | Performed by: PHYSICIAN ASSISTANT

## 2024-08-19 DIAGNOSIS — N83.201 RIGHT OVARIAN CYST: Primary | ICD-10-CM

## 2024-10-14 ENCOUNTER — LAB ENCOUNTER (OUTPATIENT)
Dept: LAB | Age: 49
End: 2024-10-14
Attending: FAMILY MEDICINE
Payer: COMMERCIAL

## 2024-10-14 DIAGNOSIS — Z00.00 ANNUAL PHYSICAL EXAM: ICD-10-CM

## 2024-10-14 LAB
ALBUMIN SERPL-MCNC: 4.4 G/DL (ref 3.2–4.8)
ALBUMIN/GLOB SERPL: 1.8 {RATIO} (ref 1–2)
ALP LIVER SERPL-CCNC: 46 U/L
ALT SERPL-CCNC: 23 U/L
ANION GAP SERPL CALC-SCNC: 3 MMOL/L (ref 0–18)
AST SERPL-CCNC: 29 U/L (ref ?–34)
BASOPHILS # BLD AUTO: 0.04 X10(3) UL (ref 0–0.2)
BASOPHILS NFR BLD AUTO: 0.9 %
BILIRUB SERPL-MCNC: 0.5 MG/DL (ref 0.3–1.2)
BUN BLD-MCNC: 15 MG/DL (ref 9–23)
CALCIUM BLD-MCNC: 9.2 MG/DL (ref 8.7–10.4)
CHLORIDE SERPL-SCNC: 109 MMOL/L (ref 98–112)
CHOLEST SERPL-MCNC: 186 MG/DL (ref ?–200)
CO2 SERPL-SCNC: 30 MMOL/L (ref 21–32)
CREAT BLD-MCNC: 0.82 MG/DL
EGFRCR SERPLBLD CKD-EPI 2021: 87 ML/MIN/1.73M2 (ref 60–?)
EOSINOPHIL # BLD AUTO: 0.1 X10(3) UL (ref 0–0.7)
EOSINOPHIL NFR BLD AUTO: 2.2 %
ERYTHROCYTE [DISTWIDTH] IN BLOOD BY AUTOMATED COUNT: 12.5 %
EST. AVERAGE GLUCOSE BLD GHB EST-MCNC: 108 MG/DL (ref 68–126)
FASTING PATIENT LIPID ANSWER: YES
FASTING STATUS PATIENT QL REPORTED: YES
GLOBULIN PLAS-MCNC: 2.4 G/DL (ref 2–3.5)
GLUCOSE BLD-MCNC: 96 MG/DL (ref 70–99)
HBA1C MFR BLD: 5.4 % (ref ?–5.7)
HCT VFR BLD AUTO: 38.5 %
HDLC SERPL-MCNC: 57 MG/DL (ref 40–59)
HGB BLD-MCNC: 12.9 G/DL
IMM GRANULOCYTES # BLD AUTO: 0 X10(3) UL (ref 0–1)
IMM GRANULOCYTES NFR BLD: 0 %
LDLC SERPL CALC-MCNC: 117 MG/DL (ref ?–100)
LYMPHOCYTES # BLD AUTO: 1.54 X10(3) UL (ref 1–4)
LYMPHOCYTES NFR BLD AUTO: 33.6 %
MCH RBC QN AUTO: 31.4 PG (ref 26–34)
MCHC RBC AUTO-ENTMCNC: 33.5 G/DL (ref 31–37)
MCV RBC AUTO: 93.7 FL
MONOCYTES # BLD AUTO: 0.53 X10(3) UL (ref 0.1–1)
MONOCYTES NFR BLD AUTO: 11.5 %
NEUTROPHILS # BLD AUTO: 2.38 X10 (3) UL (ref 1.5–7.7)
NEUTROPHILS # BLD AUTO: 2.38 X10(3) UL (ref 1.5–7.7)
NEUTROPHILS NFR BLD AUTO: 51.8 %
NONHDLC SERPL-MCNC: 129 MG/DL (ref ?–130)
OSMOLALITY SERPL CALC.SUM OF ELEC: 295 MOSM/KG (ref 275–295)
PLATELET # BLD AUTO: 298 10(3)UL (ref 150–450)
POTASSIUM SERPL-SCNC: 3.9 MMOL/L (ref 3.5–5.1)
PROT SERPL-MCNC: 6.8 G/DL (ref 5.7–8.2)
RBC # BLD AUTO: 4.11 X10(6)UL
SODIUM SERPL-SCNC: 142 MMOL/L (ref 136–145)
T4 FREE SERPL-MCNC: 1.3 NG/DL (ref 0.8–1.7)
TRIGL SERPL-MCNC: 61 MG/DL (ref 30–149)
TSI SER-ACNC: 1.63 MIU/ML (ref 0.55–4.78)
VIT B12 SERPL-MCNC: 1175 PG/ML (ref 211–911)
VIT D+METAB SERPL-MCNC: 64.8 NG/ML (ref 30–100)
VLDLC SERPL CALC-MCNC: 11 MG/DL (ref 0–30)
WBC # BLD AUTO: 4.6 X10(3) UL (ref 4–11)

## 2024-10-14 PROCEDURE — 82607 VITAMIN B-12: CPT | Performed by: FAMILY MEDICINE

## 2024-10-14 PROCEDURE — 84439 ASSAY OF FREE THYROXINE: CPT | Performed by: FAMILY MEDICINE

## 2024-10-14 PROCEDURE — 80050 GENERAL HEALTH PANEL: CPT | Performed by: FAMILY MEDICINE

## 2024-10-14 PROCEDURE — 80061 LIPID PANEL: CPT | Performed by: FAMILY MEDICINE

## 2024-10-14 PROCEDURE — 83036 HEMOGLOBIN GLYCOSYLATED A1C: CPT | Performed by: FAMILY MEDICINE

## 2024-10-14 PROCEDURE — 82306 VITAMIN D 25 HYDROXY: CPT | Performed by: FAMILY MEDICINE

## 2024-10-21 ENCOUNTER — OFFICE VISIT (OUTPATIENT)
Dept: FAMILY MEDICINE CLINIC | Facility: CLINIC | Age: 49
End: 2024-10-21
Payer: COMMERCIAL

## 2024-10-21 VITALS
DIASTOLIC BLOOD PRESSURE: 72 MMHG | RESPIRATION RATE: 16 BRPM | SYSTOLIC BLOOD PRESSURE: 122 MMHG | BODY MASS INDEX: 28 KG/M2 | HEIGHT: 63 IN | HEART RATE: 72 BPM | OXYGEN SATURATION: 96 % | WEIGHT: 158 LBS

## 2024-10-21 DIAGNOSIS — Z01.419 WELL WOMAN EXAM: Primary | ICD-10-CM

## 2024-10-21 DIAGNOSIS — D22.9 ATYPICAL NEVI: ICD-10-CM

## 2024-10-21 DIAGNOSIS — Z00.00 ANNUAL PHYSICAL EXAM: ICD-10-CM

## 2024-10-21 DIAGNOSIS — N89.8 VAGINAL DRYNESS: ICD-10-CM

## 2024-10-21 DIAGNOSIS — K64.4 EXTERNAL HEMORRHOIDS: ICD-10-CM

## 2024-10-21 DIAGNOSIS — K64.8 INTERNAL HEMORRHOIDS: ICD-10-CM

## 2024-10-21 PROCEDURE — 87624 HPV HI-RISK TYP POOLED RSLT: CPT | Performed by: FAMILY MEDICINE

## 2024-10-21 RX ORDER — ESTRADIOL 0.1 MG/G
CREAM VAGINAL
Qty: 42.5 G | Refills: 0 | Status: SHIPPED
Start: 2024-10-21

## 2024-10-21 NOTE — PROGRESS NOTES
HPI:   Roxanna Gilliland is a 49 year old female who presents for a complete physical exam.       Wt Readings from Last 6 Encounters:   10/21/24 158 lb (71.7 kg)   07/19/24 150 lb (68 kg)   04/30/24 152 lb (68.9 kg)   02/09/24 150 lb (68 kg)   11/28/23 150 lb (68 kg)   11/28/23 150 lb (68 kg)     Body mass index is 27.99 kg/m².     Cholesterol, Total (mg/dL)   Date Value   10/14/2024 186   09/12/2022 195   08/03/2021 193   01/30/2010 190     HDL Cholesterol (mg/dL)   Date Value   10/14/2024 57   09/12/2022 57   08/03/2021 57   01/30/2010 54     LDL Cholesterol Calc (mg/dL)   Date Value   01/30/2010 118 (H)     LDL Cholesterol (mg/dL)   Date Value   10/14/2024 117 (H)   09/12/2022 127 (H)   08/03/2021 123 (H)     Triglycerides (mg/dL)   Date Value   01/30/2010 90     AST (SGOT) (IU/L)   Date Value   01/30/2010 15     AST (U/L)   Date Value   10/14/2024 29   04/30/2024 22   09/12/2022 23     ALT (SGPT) (IU/L)   Date Value   01/30/2010 11     ALT (U/L)   Date Value   10/14/2024 23   04/30/2024 21   09/12/2022 19       last pap - 2022  H/o HPV   cryo - 2020  No vaginal discharge  No bladder symptoms   Last cycle July 2024   No hot flashes / weight gain and trouble sleeping   Painful intercourse   Discussed pelvic ultrasound   birth control- vasectomy  + performing self breast exams  last mammogram- due soon  dexa - never  c-scope - due   No calcium and vit D supplementation  + family history of colon cancer - grandma in her 60's    Pt has GERD - pt taking OTC PPI   Gradual weight gain       Current Outpatient Medications   Medication Sig Dispense Refill    cyclobenzaprine 10 MG Oral Tab Take 1 tablet (10 mg total) by mouth nightly. 21 tablet 0    propranolol 10 MG Oral Tab Take 1 tablet (10 mg total) by mouth 3 (three) times daily as needed. 90 tablet 0    Multiple Vitamins-Minerals (MULTIVITAMIN OR) Take by mouth.      CALCIUM OR Take by mouth.      Omega-3 Fatty Acids (FISH OIL OR) Take by mouth.      Cyanocobalamin  (VITAMIN B 12 OR) Take by mouth.        No past medical history on file.   Past Surgical History:   Procedure Laterality Date      2006    Colposcopy, cervix w/upper adjacent vagina; w/biopsy(s), cervix  2020    IAIN 1    Cryocautery of cervix  2020    Cryo    Other surgical history  child    foot surgery    [place] teeth removed        Family History   Problem Relation Age of Onset    Hypertension Father     Lipids Father     Diabetes Father     Hypertension Mother     Colon Cancer Maternal Grandmother         dx age unknown    Breast Cancer Maternal Cousin Female 33    No Known Problems Paternal Grandmother     No Known Problems Maternal Grandfather     No Known Problems Paternal Grandfather     No Known Problems Sister     No Known Problems Son     Heart Disease Neg     Stroke Neg       Social History:   Social History     Socioeconomic History    Marital status:     Number of children: 1   Occupational History    Occupation: /computer work   Tobacco Use    Smoking status: Never    Smokeless tobacco: Never   Vaping Use    Vaping status: Never Used   Substance and Sexual Activity    Alcohol use: Never    Drug use: Never   Other Topics Concern    Caffeine Concern Yes    Exercise Yes   Social History Narrative    ** Merged History Encounter **          Occ:. : . Children: . 1  Digital marketing / [employer]   Exercise: 4-5 x per week  Diet: fair      REVIEW OF SYSTEMS:   GENERAL: feels well otherwise  SKIN: denies any unusual skin lesions  EYES:denies blurred vision or double vision  HEENT: denies nasal congestion, sinus pain or ST  LUNGS: denies shortness of breath with exertion  CARDIOVASCULAR: denies chest pain on exertion  GI: denies abdominal pain,denies heartburn  : denies dysuria, vaginal discharge or itching,  MUSCULOSKELETAL: denies back pain  NEURO: denies headaches  PSYCHE: denies depression or anxiety  HEMATOLOGIC: denies hx of anemia  ENDOCRINE: denies  thyroid history  ALL/ASTHMA: denies asthma    EXAM:   /72   Pulse 72   Resp 16   Ht 5' 3\" (1.6 m)   Wt 158 lb (71.7 kg)   LMP 07/01/2024 (Approximate)   SpO2 96%   BMI 27.99 kg/m²   Body mass index is 27.99 kg/m².   GENERAL: alert and oriented X 3, well developed, well nourished,in no apparent distress  CARDIO: RRR without murmur  LUNGS: clear to auscultation  NECK: supple,no adenopathy,no thyromegaly  HEENT: atraumatic, normocephalic,ears and throat are clear  EYES:PERRLA, EOMI, normal,conjunctiva are clear  SKIN: norashes,no suspicious lesions  GI: good BS's,no masses, HSM or tenderness  CHEST: no chest tenderness  BREAST: no axillary LAD, no masses no nipple discharge bilaterally  : external genitalia - no inguinal LAD, no lesions.   Speculum exam- introitus is normal,scant discharge,cervix is pink.   Bimanual exam- no adnexal masses or tenderness, PAP was done   RECTAL:good rectal tone,no mass, brown stool, stool is OB negative, internal and external hemorrhoids   MUSCULOSKELETAL: back is not tender,FROM of the back  EXTREMITIES: no cyanosis, clubbing or edema  NEURO: cranial nerves are intact,motor and sensory are grossly intact    ASSESSMENT AND PLAN:   Roxanna Gilliland is a 49 year old female who presents for a well woman exam.    1. Well woman exam    - ThinPrep PAP Smear; Future  - Hpv High Risk , Thin Prep Collection; Future  - ThinPrep PAP Smear  - Hpv High Risk , Thin Prep Collection  - Image-Guided Pap Smear ([facility])    2. Annual physical exam      3. Vaginal dryness  Trial of   - estradiol (ESTRACE) 0.1 MG/GM Vaginal Cream; 2g PV daily for 2 weeks then prn  Dispense: 42.5 g; Refill: 0    4. Atypical nevi    - Derm Referral - [facility]    5. Internal hemorrhoids      6. External hemorrhoids           Discussed diet, exercise,calcium, vitamin D, fish oil and self breast exams.   Questions answered and patient indicates understanding of these issues and agrees to the plan.  Follow up in  1 year or sooner if needed.

## 2024-10-22 LAB — HPV E6+E7 MRNA CVX QL NAA+PROBE: NEGATIVE

## 2024-10-25 LAB
.: NORMAL
.: NORMAL

## 2024-11-20 ENCOUNTER — HOSPITAL ENCOUNTER (OUTPATIENT)
Dept: CT IMAGING | Age: 49
Discharge: HOME | End: 2024-11-20
Attending: FAMILY MEDICINE

## 2024-11-20 DIAGNOSIS — Z13.6 SCREENING FOR CARDIOVASCULAR CONDITION: ICD-10-CM

## 2024-11-20 DIAGNOSIS — Z13.9 VISIT FOR SCREENING: ICD-10-CM

## 2024-11-22 ENCOUNTER — HOSPITAL ENCOUNTER (OUTPATIENT)
Dept: MAMMOGRAPHY | Age: 49
Discharge: HOME | End: 2024-11-22
Attending: SURGERY
Payer: COMMERCIAL

## 2024-11-22 DIAGNOSIS — N60.91 ATYPICAL LOBULAR HYPERPLASIA (ALH) OF RIGHT BREAST: ICD-10-CM

## 2024-11-22 DIAGNOSIS — R92.1 BREAST CALCIFICATION, RIGHT: ICD-10-CM

## 2024-11-22 PROCEDURE — 77066 DX MAMMO INCL CAD BI: CPT | Performed by: SURGERY

## 2024-11-22 PROCEDURE — 77062 BREAST TOMOSYNTHESIS BI: CPT | Performed by: SURGERY

## 2024-11-25 ENCOUNTER — HOSPITAL ENCOUNTER (OUTPATIENT)
Dept: ULTRASOUND IMAGING | Age: 49
Discharge: HOME | End: 2024-11-25
Attending: FAMILY MEDICINE
Payer: COMMERCIAL

## 2024-11-25 DIAGNOSIS — N83.201 RIGHT OVARIAN CYST: ICD-10-CM

## 2024-11-25 PROCEDURE — 76830 TRANSVAGINAL US NON-OB: CPT | Performed by: FAMILY MEDICINE

## 2024-11-25 PROCEDURE — 76856 US EXAM PELVIC COMPLETE: CPT | Performed by: FAMILY MEDICINE

## 2025-06-05 ENCOUNTER — OFFICE VISIT (OUTPATIENT)
Dept: SURGERY | Facility: CLINIC | Age: 50
End: 2025-06-05
Payer: COMMERCIAL

## 2025-06-05 VITALS
SYSTOLIC BLOOD PRESSURE: 131 MMHG | WEIGHT: 166 LBS | BODY MASS INDEX: 29 KG/M2 | OXYGEN SATURATION: 98 % | DIASTOLIC BLOOD PRESSURE: 76 MMHG | RESPIRATION RATE: 16 BRPM | HEART RATE: 54 BPM

## 2025-06-05 DIAGNOSIS — Z12.31 ENCOUNTER FOR SCREENING MAMMOGRAM FOR HIGH-RISK PATIENT: ICD-10-CM

## 2025-06-05 DIAGNOSIS — F41.9 ANXIETY: ICD-10-CM

## 2025-06-05 DIAGNOSIS — N60.91 ATYPICAL LOBULAR HYPERPLASIA (ALH) OF RIGHT BREAST: Primary | ICD-10-CM

## 2025-06-05 PROCEDURE — 3075F SYST BP GE 130 - 139MM HG: CPT

## 2025-06-05 PROCEDURE — 3078F DIAST BP <80 MM HG: CPT

## 2025-06-05 PROCEDURE — 99214 OFFICE O/P EST MOD 30 MIN: CPT

## 2025-06-05 RX ORDER — DIAZEPAM 5 MG/1
5 TABLET ORAL EVERY 6 HOURS PRN
Qty: 5 TABLET | Refills: 0 | Status: SHIPPED | OUTPATIENT
Start: 2025-06-05

## 2025-06-12 NOTE — PROGRESS NOTES
Breast Surgery surveillance visit      History of Present Illness:   Ms. Roxanna Gilliland is a 49 year old woman who presents with detected concern of the right breast.  The patient denies any palpable masses, nipple discharge, skin changes or axillary symptoms.  She does have a family history of breast cancer.  She has no personal prior history of breast disease or biopsies.  She presented for screening mammogram on 2023 and was found to have heterogeneously dense breast tissue with indeterminate calcifications in the right breast.  She had a right diagnostic evaluation on 2023 that confirmed the indeterminate calcifications which biopsy was recommended as well as a likely benign subcentimeter mass seen at 10:00 for which follow-up ultrasound was ultimately recommended.  She had the right breast calcifications biopsied on November 15, 2023 and was found to have foci of atypical lobular hyperplasia.  She underwent a breast MRI on 2024 which showed no concerning findings therefore no immediate intervention was recommended at that time.  She started on hormone replacement therapy approximately 2 months ago for hot flashes, night sweats, and insomnia.  Most recent imaging was a bilateral diagnostic mammogram on 2024 which showed heterogeneously dense tissue with no suspicious findings.  She is here today for evaluation and recommendations for further therapy.        History reviewed. No pertinent past medical history.    Past Surgical History:   Procedure Laterality Date          Colposcopy, cervix w/upper adjacent vagina; w/biopsy(s), cervix  2020    IAIN 1    Cryocautery of cervix  2020    Cryo    Needle biopsy right Right 2023    Other surgical history  child    foot surgery    [place] teeth removed         Gynecological History:  Pt is a   Pt was 31 years old at time of first pregnancy.    She denies any cumulative breastfeeding  history   She achieved menarche at age 11 and LMP 11/2/2023  She denies any history of hormone replacement therapy   She has history of oral contraceptive use for 15 years, last in 2013.  She denies infertility treatment to achieve pregnancy.    Medications:     diazePAM 5 MG Oral Tab Take 1 tablet (5 mg total) by mouth every 6 (six) hours as needed for Anxiety. 5 tablet 0    estradiol (ESTRACE) 0.1 MG/GM Vaginal Cream 2g PV daily for 2 weeks then prn 42.5 g 0    cyclobenzaprine 10 MG Oral Tab Take 1 tablet (10 mg total) by mouth nightly. 21 tablet 0    propranolol 10 MG Oral Tab Take 1 tablet (10 mg total) by mouth 3 (three) times daily as needed. 90 tablet 0    Multiple Vitamins-Minerals (MULTIVITAMIN OR) Take by mouth.      CALCIUM OR Take by mouth.      Omega-3 Fatty Acids (FISH OIL OR) Take by mouth.      Cyanocobalamin (VITAMIN B 12 OR) Take by mouth.         Allergies:    Allergies   Allergen Reactions    Amoxicillin RASH       Family History:   Family History   Problem Relation Age of Onset    Hypertension Father     Lipids Father     Diabetes Father     Hypertension Mother     Colon Cancer Maternal Grandmother         dx age unknown    Breast Cancer Maternal Cousin Female 33    No Known Problems Paternal Grandmother     No Known Problems Maternal Grandfather     No Known Problems Paternal Grandfather     No Known Problems Sister     No Known Problems Son     Heart Disease Neg     Stroke Neg        She is not of Ashkenazi Catholic ancestry.    Social History:  History   Alcohol Use Never       History   Smoking Status    Never   Smokeless Tobacco    Never     Ms. Roxanna Gilliland is  with 1 children. She has 1 siblings. She is currently Employed full-time    Review of Systems:  General:   The patient denies, fever, chills, night sweats, fatigue, generalized weakness, change in appetite or weight loss.    HEENT:     The patient denies eye irritation, cataracts, redness, glaucoma, yellowing of the eyes,  change in vision, color blindness, or +wearing contacts/glasses. The patient denies hearing loss, ringing in the ears, ear drainage, earaches, nasal congestion, nose bleeds, snoring, pain in mouth/throat, hoarseness, change in voice, facial trauma.    Respiratory:  The patient denies chronic cough, phlegm, hemoptysis, pleurisy/chest pain, pneumonia, asthma, wheezing, difficulty in breathing with exertion, emphysema, chronic bronchitis, shortness of breath or abnormal sound when breathing.     Cardiovascular:  There is no history of chest pain, chest pressure/discomfort, palpitations, irregular heartbeat, fainting or near-fainting, difficulty breathing when lying flat, SOB/Coughing at night, swelling of the legs or chest pain while walking.    Breasts:  See history of present illness    Gastrointestinal:     There is no history of difficulty or pain with swallowing, reflux symptoms, vomiting, dark or bloody stools, constipation, yellowing of the skin, indigestion, nausea, change in bowel habits, diarrhea, abdominal pain or vomiting blood.     Genitourinary:  The patient denies frequent urination, +needing to get up at night to urinate, urinary hesitancy or retaining urine, painful urination, urinary incontinence, decreased urine stream, blood in the urine or vaginal/penile discharge.    Skin:    The patient denies rash, itching, skin lesions, dry skin, change in skin color or change in moles.     Hematologic/Lymphatic:  The patient denies easily bruising or bleeding or persistent swollen glands or lymph nodes.     Musculoskeletal:  The patient denies muscle aches/pain, +joint pain, stiff joints, neck pain, +back pain or bone pain.    Neuropsychiatric:  There is no history of migraines or severe headaches, seizure/epilepsy, speech problems, coordination problems, trembling/tremors, fainting/black outs, dizziness, memory problems, loss of sensation/numbness, problems walking, weakness, tingling or burning in  hands/feet. There is no history of abusive relationship, bipolar disorder, sleep disturbance, +anxiety, depression or feeling of despair.    Endocrine:    There is no history of poor/slow wound healing, weight loss/gain, fertility or hormone problems, cold intolerance, thyroid disease.     Allergic/Immunologic:  There is no history of hives, hay fever, angioedema or anaphylaxis.    /76 (BP Location: Right arm, Patient Position: Sitting, Cuff Size: adult)   Pulse 54   Resp 16   Wt 75.3 kg (166 lb)   LMP 07/01/2024 (Approximate)   SpO2 98%   BMI 29.41 kg/m²     Physical Exam:  The patient is an alert, oriented, well-nourished and  well-developed woman who appears her stated age. Her speech patterns and movements are normal. Her affect is appropriate.    HEENT: The head is normocephalic. The neck is supple. The thyroid is not enlarged and is without palpable masses/nodules. There are no palpable masses. The trachea is in the midline. Conjunctiva are clear, non-icteric.    Chest: The chest expands symmetrically. The lungs are clear to auscultation.    Heart: The rhythm is regular.  There are no murmurs, rubs, gallops or thrills.    Breasts:  Her breasts are symmetrical with a cup size 36B.  Right breast: The skin, nipple ,and areola appear normal. There is no skin dimpling with movement of the pectoralis. There is no nipple retraction. No nipple discharge can be elicited. The parenchyma is mildly nodular. There are no dominant masses in the breast. The axillary tail is normal.  Left breast:   The skin, nipple, and areola appear normal. There is no skin dimpling with movement of the pectoralis. There is no nipple retraction. No nipple discharge can be elicited. The parenchyma is mildly nodular. There are no dominant masses in the breast. The axillary tail is normal.    Abdomen:  The abdomen is soft, flat and non tender. The liver is not enlarged. There are no palpable masses.    Lymph Nodes:  The  supraclavicular, axillary and cervical regions are free of significant lymphadenopathy.    Back: There is no vertebral column tenderness.    Skin: The skin appears normal. There are no suspicious appearing rashes or lesions.    Extremities: The extremities are without deformity, cyanosis or edema.    Impression:   Ms. Roxanna Gilliland is a 49 year old woman presents with history of breast cancer and biopsy confirmed right breast atypical lobular hyperplasia.    Discussion and Plan:  I had a discussion with the Patient regarding her breast exam.  On exam today I found no evidence of malignancy bilaterally.      The significance and implications of ALH found on core biopsy with regard to future breast cancer risk was discussed with the patient. I explained that surigical excision of the area in the setting of ALH is not routinely advocated as this is thought to be a marker of increased risk rather than a true precursor lesion. I discussed that her risk of invasive disease is conferred bilaterally and that subsequent cancers can be of both the ductal and lobular phenotype. Options with regard to management can include: (1) lifelong surveillance with the goal of detecting subsequent malignancy at an early stage; with or without (2) chemoprevention.    Due to her history of ALH I recommend we continue screening MRIs in addition to her yearly mammograms.  We discussed hormone replacement therapy with her history of ALH, she will need to be monitored very closely.  The risks and possible complications of the procedure were explained to the patient and her family and she understood and agreed to the proposed plan. She was given ample opportunity for questions and those questions were answered to her satisfaction. She has been  encouraged to contact the office with any questions or concerns prior to her next appointment.

## 2025-07-10 ENCOUNTER — HOSPITAL ENCOUNTER (OUTPATIENT)
Dept: MRI IMAGING | Facility: HOSPITAL | Age: 50
Discharge: HOME | End: 2025-07-10
Payer: COMMERCIAL

## 2025-07-10 DIAGNOSIS — N60.91 ATYPICAL LOBULAR HYPERPLASIA (ALH) OF RIGHT BREAST: ICD-10-CM

## 2025-07-10 PROCEDURE — 77049 MRI BREAST C-+ W/CAD BI: CPT

## 2025-07-10 PROCEDURE — A9575 INJ GADOTERATE MEGLUMI 0.1ML: HCPCS

## 2025-07-10 RX ORDER — GADOTERATE MEGLUMINE 376.9 MG/ML
15 INJECTION INTRAVENOUS
Status: COMPLETED | OUTPATIENT
Start: 2025-07-10 | End: 2025-07-10

## 2025-07-10 RX ADMIN — GADOTERATE MEGLUMINE 15 ML: 376.9 INJECTION INTRAVENOUS at 19:22:00

## 2025-08-27 ENCOUNTER — HOSPITAL ENCOUNTER (OUTPATIENT)
Age: 50
Discharge: HOME | End: 2025-08-27
Attending: EMERGENCY MEDICINE

## 2025-08-27 VITALS
OXYGEN SATURATION: 99 % | TEMPERATURE: 98 F | HEIGHT: 63 IN | SYSTOLIC BLOOD PRESSURE: 160 MMHG | BODY MASS INDEX: 30.12 KG/M2 | RESPIRATION RATE: 18 BRPM | WEIGHT: 170 LBS | HEART RATE: 57 BPM | DIASTOLIC BLOOD PRESSURE: 78 MMHG

## 2025-08-27 DIAGNOSIS — T24.202A: Primary | ICD-10-CM

## 2025-08-27 PROCEDURE — 16020 DRESS/DEBRID P-THICK BURN S: CPT

## 2025-08-27 PROCEDURE — 90471 IMMUNIZATION ADMIN: CPT

## 2025-08-27 PROCEDURE — 99213 OFFICE O/P EST LOW 20 MIN: CPT

## 2025-08-27 RX ORDER — SILVER SULFADIAZINE 10 MG/G
CREAM TOPICAL
Qty: 25 G | Refills: 0 | Status: SHIPPED | OUTPATIENT
Start: 2025-08-27

## (undated) NOTE — LETTER
[facility] Department of OB/GYN  After Care Instructions for Colposcopy/Biopsy      Biopsy Results   You will receive a phone call with your biopsy results in 7 business days. If you have not received your biopsy results in 7 days, please contact our office.   Robert Williamson

## (undated) NOTE — MR AVS SNAPSHOT
After Visit Summary   9/16/2021    Reva Parry   MRN: [id number]           Visit Information     Date & Time  9/16/2021  3:00 PM Provider  Osei Garcia  Department  [address]  Dept.  Phone  [phone]      Your us do so. For more information on [employer], please visit [email or web address]                   DO YOU KNOW WHERE TO GO? [facility] are never convenient.  If you are dealing with a   non-emergency, consider your options bef visit [email or web address] or call [phone]. ([phone])

## (undated) NOTE — LETTER
[facility], :10/14/1975    CONSENT FOR PROCEDURE/SEDATION    1. I authorize the performance upon [address] [place] [address]  the following: Cryo    2.  I authorize Dr. Elaine Ordoñez DO (and whomever is designated as the doctor’s assistant), to [employer] Witness: _________________________________________ Date:___________     Physician Signature: _______________________________ Date:___________

## (undated) NOTE — LETTER
Kyle Villalba, :10/14/1975    CONSENT FOR PROCEDURE/SEDATION    1. I authorize the performance upon Kyle Villalba  the following: Colposcopy    2.  I authorize Dr. Carlee Ramirez DO (and whomever is designated as the doctor’s assistant), to per Witness: _________________________________________ Date:___________     Physician Signature: _______________________________ Date:___________

## (undated) NOTE — MR AVS SNAPSHOT
[address]  [address] [facility], [place] [id number] [phone]               Thank you for choosing us for your health care visit with Dusty Quiñonez DO.   We are glad to serve you and happy to provide you with this pelayo Assoc Dx: Well woman exam with routine gynecological exam [Z01.419]           Vitamin B12    Complete by: Feb 07, 2017 (Approximate)    Assoc Dx:   Well woman exam with routine gynecological exam [Z01.419]           Vitamin D, 25-Hydroxy    Complete by: Make half your plate fruits and vegetables Highly refined, white starches including white bread, rice and pasta   Eat plenty of protein, keep the fat content low Sugars:  sodas and sports drinks, candies and desserts   Eat plenty of low-fat dairy products

## (undated) NOTE — LETTER
2021              Roxanna Goldberg [address] [place] [address]         To Whom it May Concern:    Caryle Breeze ( 10/14/1975) is currently under my care at the [facility].  She is currently not cl

## (undated) NOTE — MR AVS SNAPSHOT
After Visit Summary   3/1/2021    Karolyn Wilkerson    MRN: [id number]           Visit Information     Date & Time  3/1/2021  4:45 PM Provider  Chris Rob DO [facility]  [address]  Dept.  Phone  [phone]      Your V [facility] now offers Video Visits through [address] for adult and pediatric patients. Video Visits are available Monday - Friday for many common conditions such as allergies, colds, cough, fever, rash, sore throat, headache and pink eye.   The cost for a Video Vi [address]   Monday – Friday  4:00 pm – 10:00 pm   Saturday – Sunday  10:00 am – 4:00 pm  WALK-IN CARE  Emergency Medicine Providers  Conditions needing urgent attention, but are   non-life-threatening.     Also available by appointment Average cost  $120*

## (undated) NOTE — LETTER
[facility] Department of OB/GYN  After Care Instructions for Cryo       Discharge/Bleeding    The abnormal tissue of the cervix the cryo destroyed will shed which results in a copious amount of watery discharge that will be mixed with light bleeding for 2-3 weeks